# Patient Record
Sex: MALE | Race: WHITE | NOT HISPANIC OR LATINO | Employment: FULL TIME | URBAN - NONMETROPOLITAN AREA
[De-identification: names, ages, dates, MRNs, and addresses within clinical notes are randomized per-mention and may not be internally consistent; named-entity substitution may affect disease eponyms.]

---

## 2019-08-23 ENCOUNTER — TRANSCRIBE ORDERS (OUTPATIENT)
Dept: ADMINISTRATIVE | Facility: HOSPITAL | Age: 39
End: 2019-08-23

## 2019-08-23 DIAGNOSIS — G56.03 CARPAL TUNNEL SYNDROME, BILATERAL: Primary | ICD-10-CM

## 2019-09-10 ENCOUNTER — HOSPITAL ENCOUNTER (OUTPATIENT)
Dept: NEUROLOGY | Facility: HOSPITAL | Age: 39
Discharge: HOME | End: 2019-09-10
Admitting: ORTHOPAEDIC SURGERY

## 2019-09-10 DIAGNOSIS — G56.03 CARPAL TUNNEL SYNDROME, BILATERAL: ICD-10-CM

## 2019-09-10 PROCEDURE — 95886 MUSC TEST DONE W/N TEST COMP: CPT

## 2019-09-10 PROCEDURE — 95911 NRV CNDJ TEST 9-10 STUDIES: CPT

## 2019-09-24 ENCOUNTER — APPOINTMENT (OUTPATIENT)
Dept: PREADMISSION TESTING | Facility: HOSPITAL | Age: 39
End: 2019-09-24

## 2019-09-24 VITALS
HEIGHT: 69 IN | OXYGEN SATURATION: 98 % | WEIGHT: 231.26 LBS | RESPIRATION RATE: 16 BRPM | DIASTOLIC BLOOD PRESSURE: 75 MMHG | SYSTOLIC BLOOD PRESSURE: 165 MMHG | HEART RATE: 73 BPM | BODY MASS INDEX: 34.25 KG/M2

## 2019-09-24 LAB
ANION GAP SERPL CALCULATED.3IONS-SCNC: 13 MMOL/L (ref 5–15)
BUN BLD-MCNC: 28 MG/DL (ref 6–20)
BUN/CREAT SERPL: 23.3 (ref 7–25)
CALCIUM SPEC-SCNC: 9.4 MG/DL (ref 8.6–10.5)
CHLORIDE SERPL-SCNC: 104 MMOL/L (ref 98–107)
CO2 SERPL-SCNC: 25 MMOL/L (ref 22–29)
CREAT BLD-MCNC: 1.2 MG/DL (ref 0.76–1.27)
DEPRECATED RDW RBC AUTO: 39.5 FL (ref 37–54)
ERYTHROCYTE [DISTWIDTH] IN BLOOD BY AUTOMATED COUNT: 12.2 % (ref 12.3–15.4)
GFR SERPL CREATININE-BSD FRML MDRD: 67 ML/MIN/1.73
GLUCOSE BLD-MCNC: 97 MG/DL (ref 65–99)
HCT VFR BLD AUTO: 53.3 % (ref 37.5–51)
HGB BLD-MCNC: 19 G/DL (ref 13–17.7)
MCH RBC QN AUTO: 31.3 PG (ref 26.6–33)
MCHC RBC AUTO-ENTMCNC: 35.6 G/DL (ref 31.5–35.7)
MCV RBC AUTO: 87.7 FL (ref 79–97)
PLATELET # BLD AUTO: 197 10*3/MM3 (ref 140–450)
PMV BLD AUTO: 10.7 FL (ref 6–12)
POTASSIUM BLD-SCNC: 3.9 MMOL/L (ref 3.5–5.2)
RBC # BLD AUTO: 6.08 10*6/MM3 (ref 4.14–5.8)
SODIUM BLD-SCNC: 142 MMOL/L (ref 136–145)
WBC NRBC COR # BLD: 5.77 10*3/MM3 (ref 3.4–10.8)

## 2019-09-24 PROCEDURE — 93005 ELECTROCARDIOGRAM TRACING: CPT

## 2019-09-24 PROCEDURE — 36415 COLL VENOUS BLD VENIPUNCTURE: CPT

## 2019-09-24 PROCEDURE — 80048 BASIC METABOLIC PNL TOTAL CA: CPT | Performed by: ORTHOPAEDIC SURGERY

## 2019-09-24 PROCEDURE — 85027 COMPLETE CBC AUTOMATED: CPT | Performed by: ORTHOPAEDIC SURGERY

## 2019-09-24 PROCEDURE — 93010 ELECTROCARDIOGRAM REPORT: CPT | Performed by: INTERNAL MEDICINE

## 2019-09-24 NOTE — DISCHARGE INSTRUCTIONS
DAY OF SURGERY INSTRUCTIONS        YOUR SURGEON: dr doran    PROCEDURE: ***right carpal geo release    DATE OF SURGERY: ***10/01/2019    ARRIVAL TIME: AS DIRECTED BY OFFICE    YOU MAY TAKE THE FOLLOWING MEDICATION(S) THE MORNING OF SURGERY WITH A SIP OF WATER: ***NO MEDS PER  ANESTHESIA      ALL OTHER HOME MEDICATION CHECK WITH YOUR PHYSICIAN                MANAGING PAIN AFTER SURGERY    We know you are probably wondering what your pain will be like after surgery.  Following surgery it is unrealistic to expect you will not have pain.   Pain is how our bodies let us know that something is wrong or cautions us to be careful.  That said, our goal is to make your pain tolerable.    Methods we may use to treat your pain include (oral or IV medications, PCAs, epidurals, nerve blocks, etc.)   While some procedures require IV pain medications for a short time after surgery, transitioning to pain medications by mouth allows for better management of pain.   Your nurse will encourage you to take oral pain medications whenever possible.  IV medications work almost immediately, but only last a short while.  Taking medications by mouth allows for a more constant level of medication in your blood stream for a longer period of time.      Once your pain is out of control it is harder to get back under control.  It is important you are aware when your next dose of pain medication is due.  If you are admitted, your nurse may write the time of your next dose on the white board in your room to help you remember.      We are interested in your pain and encourage you to inform us about aggravating factors during your visit.   Many times a simple repositioning every few hours can make a big difference.    If your physician says it is okay, do not let your pain prevent you from getting out of bed. Be sure to call your nurse for assistance prior to getting up so you do not fall.      Before surgery, please decide your tolerable pain  goal.  These faces help describe the pain ratings we use on a 0-10 scale.   Be prepared to tell us your goal and whether or not you take pain or anxiety medications at home.          BEFORE YOU COME TO THE HOSPITAL  (Pre-op instructions)  • Do not eat, drink, smoke or chew gum after midnight the night before surgery.  This also includes no mints.  • Morning of surgery take only the medicines you have been instructed with a sip of water unless otherwise instructed  by your physician.  • Do not shave, wear makeup or dark nail polish.  • Remove all jewelry including rings.  • Leave anything you consider valuable at home.  • Leave your suitcase in the car until after your surgery.  • Bring the following with you if applicable:  o Picture ID and insurance, Medicare or Medicaid cards  o Co-pay/deductible required by insurance (cash, check, credit card)  o Copy of advance directive, living will or power-of- documents if not brought to PAT  o CPAP or BIPAP mask and tubing  o Relaxation aids ( book, magazine), etc.  o Hearing aids                        ON THE DAY OF SURGERY  · On the day of surgery check in at registration located at the main entrance of the hospital.   ? You will be registered and given a beeper with instructions where to wait in the main lobby.  ? When your beeper lights up and vibrates a member of the Outpatient Surgery staff will meet you at the double doors under the stair steps and escort you to your preoperative room.   · You may have cloth compression devices placed on your legs. These help to prevent blood clots and reduce swelling in your legs.  · An IV may be inserted into one of your veins.  · In the operating room, you may be given one or more of the following:  ? A medicine to help you relax (sedative).  ? A medicine to numb the area (local anesthetic).  ? A medicine to make you fall asleep (general anesthetic).  ? A medicine that is injected into an area of your body to numb  "everything below the injection site (regional anesthetic).  · Your surgical site will be marked or identified.  · You may be given an antibiotic through your IV to help prevent infection.  Contact a health care provider if you:  · Develop a fever of more than 100.4°F (38°C) or other feelings of illness during the 48 hours before your surgery.  · Have symptoms that get worse.  Have questions or concerns about your surgery    General Anesthesia/Surgery, Adult  General anesthesia is the use of medicines to make a person \"go to sleep\" (unconscious) for a medical procedure. General anesthesia must be used for certain procedures, and is often recommended for procedures that:  · Last a long time.  · Require you to be still or in an unusual position.  · Are major and can cause blood loss.  The medicines used for general anesthesia are called general anesthetics. As well as making you unconscious for a certain amount of time, these medicines:  · Prevent pain.  · Control your blood pressure.  · Relax your muscles.  Tell a health care provider about:  · Any allergies you have.  · All medicines you are taking, including vitamins, herbs, eye drops, creams, and over-the-counter medicines.  · Any problems you or family members have had with anesthetic medicines.  · Types of anesthetics you have had in the past.  · Any blood disorders you have.  · Any surgeries you have had.  · Any medical conditions you have.  · Any recent upper respiratory, chest, or ear infections.  · Any history of:  ? Heart or lung conditions, such as heart failure, sleep apnea, asthma, or chronic obstructive pulmonary disease (COPD).  ?  service.  ? Depression or anxiety.  · Any tobacco or drug use, including marijuana or alcohol use.  · Whether you are pregnant or may be pregnant.  What are the risks?  Generally, this is a safe procedure. However, problems may occur, including:  · Allergic reaction.  · Lung and heart problems.  · Inhaling food or " liquid from the stomach into the lungs (aspiration).  · Nerve injury.  · Air in the bloodstream, which can lead to stroke.  · Extreme agitation or confusion (delirium) when you wake up from the anesthetic.  · Waking up during your procedure and being unable to move. This is rare.  These problems are more likely to develop if you are having a major surgery or if you have an advanced or serious medical condition. You can prevent some of these complications by answering all of your health care provider's questions thoroughly and by following all instructions before your procedure.  General anesthesia can cause side effects, including:  · Nausea or vomiting.  · A sore throat from the breathing tube.  · Hoarseness.  · Wheezing or coughing.  · Shaking chills.  · Tiredness.  · Body aches.  · Anxiety.  · Sleepiness or drowsiness.  · Confusion or agitation.  RISKS AND COMPLICATIONS OF SURGERY  Your health care provider will discuss possible risks and complications with you before surgery. Common risks and complications include:    · Problems due to the use of anesthetics.  · Blood loss and replacement (does not apply to minor surgical procedures).  · Temporary increase in pain due to surgery.  · Uncorrected pain or problems that the surgery was meant to correct.  · Infection.  · New damage.    What happens before the procedure?    Medicines  Ask your health care provider about:  · Changing or stopping your regular medicines. This is especially important if you are taking diabetes medicines or blood thinners.  · Taking medicines such as aspirin and ibuprofen. These medicines can thin your blood. Do not take these medicines unless your health care provider tells you to take them.  · Taking over-the-counter medicines, vitamins, herbs, and supplements. Do not take these during the week before your procedure unless your health care provider approves them.  General instructions  · Starting 3-6 weeks before the procedure, do not  use any products that contain nicotine or tobacco, such as cigarettes and e-cigarettes. If you need help quitting, ask your health care provider.  · If you brush your teeth on the morning of the procedure, make sure to spit out all of the toothpaste.  · Tell your health care provider if you become ill or develop a cold, cough, or fever.  · If instructed by your health care provider, bring your sleep apnea device with you on the day of your surgery (if applicable).  · Ask your health care provider if you will be going home the same day, the following day, or after a longer hospital stay.  ? Plan to have someone take you home from the hospital or clinic.  ? Plan to have a responsible adult care for you for at least 24 hours after you leave the hospital or clinic. This is important.  What happens during the procedure?  · You will be given anesthetics through both of the following:  ? A mask placed over your nose and mouth.  ? An IV in one of your veins.  · You may receive a medicine to help you relax (sedative).  · After you are unconscious, a breathing tube may be inserted down your throat to help you breathe. This will be removed before you wake up.  · An anesthesia specialist will stay with you throughout your procedure. He or she will:  ? Keep you comfortable and safe by continuing to give you medicines and adjusting the amount of medicine that you get.  ? Monitor your blood pressure, pulse, and oxygen levels to make sure that the anesthetics do not cause any problems.  The procedure may vary among health care providers and hospitals.  What happens after the procedure?  · Your blood pressure, temperature, heart rate, breathing rate, and blood oxygen level will be monitored until the medicines you were given have worn off.  · You will wake up in a recovery area. You may wake up slowly.  · If you feel anxious or agitated, you may be given medicine to help you calm down.  · If you will be going home the same day, your  health care provider may check to make sure you can walk, drink, and urinate.  · Your health care provider will treat any pain or side effects you have before you go home.  · Do not drive for 24 hours if you were given a sedative.  Summary  · General anesthesia is used to keep you still and prevent pain during a procedure.  · It is important to tell your healthcare provider about your medical history and any surgeries you have had, and previous experience with anesthesia.  · Follow your healthcare provider’s instructions about when to stop eating, drinking, or taking certain medicines before your procedure.  · Plan to have someone take you home from the hospital or clinic.  This information is not intended to replace advice given to you by your health care provider. Make sure you discuss any questions you have with your health care provider.  Document Released: 03/26/2009 Document Revised: 08/03/2018 Document Reviewed: 08/03/2018  [employer] Interactive Patient Education © 2019 [employer]       Fall Prevention in Hospitals, Adult  As a hospital patient, your condition and the treatments you receive can increase your risk for falls. Some additional risk factors for falls in a hospital include:  · Being in an unfamiliar environment.  · Being on bed rest.  · Your surgery.  · Taking certain medicines.  · Your tubing requirements, such as intravenous (IV) therapy or catheters.  It is important that you learn how to decrease fall risks while at the hospital. Below are important tips that can help prevent falls.  SAFETY TIPS FOR PREVENTING FALLS  Talk about your risk of falling.  · Ask your health care provider why you are at risk for falling. Is it your medicine, illness, tubing placement, or something else?  · Make a plan with your health care provider to keep you safe from falls.  · Ask your health care provider or pharmacist about side effects of your medicines. Some medicines can make you dizzy or affect your  coordination.  Ask for help.  · Ask for help before getting out of bed. You may need to press your call button.  · Ask for assistance in getting safely to the toilet.  · Ask for a walker or cane to be put at your bedside. Ask that most of the side rails on your bed be placed up before your health care provider leaves the room.  · Ask family or friends to sit with you.  · Ask for things that are out of your reach, such as your glasses, hearing aids, telephone, bedside table, or call button.  Follow these tips to avoid falling:  · Stay lying or seated, rather than standing, while waiting for help.  · Wear rubber-soled slippers or shoes whenever you walk in the hospital.  · Avoid quick, sudden movements.  ¨ Change positions slowly.  ¨ Sit on the side of your bed before standing.  ¨ Stand up slowly and wait before you start to walk.  · Let your health care provider know if there is a spill on the floor.  · Pay careful attention to the medical equipment, electrical cords, and tubes around you.  · When you need help, use your call button by your bed or in the bathroom. Wait for one of your health care providers to help you.  · If you feel dizzy or unsure of your footing, return to bed and wait for assistance.  · Avoid being distracted by the TV, telephone, or another person in your room.  · Do not lean or support yourself on rolling objects, such as IV poles or bedside tables.     This information is not intended to replace advice given to you by your health care provider. Make sure you discuss any questions you have with your health care provider.     Document Released: 12/15/2001 Document Revised: 01/08/2016 Document Reviewed: 08/25/2013  [employer] Interactive Patient Education ©2016 [employer]       Surgical Site Infections FAQs  What is a Surgical Site Infection (SSI)?  A surgical site infection is an infection that occurs after surgery in the part of the body where the surgery took place. Most patients who have  surgery do not develop an infection. However, infections develop in about 1 to 3 out of every 100 patients who have surgery.  Some of the common symptoms of a surgical site infection are:  · Redness and pain around the area where you had surgery  · Drainage of cloudy fluid from your surgical wound  · Fever  Can SSIs be treated?  Yes. Most surgical site infections can be treated with antibiotics. The antibiotic given to you depends on the bacteria (germs) causing the infection. Sometimes patients with SSIs also need another surgery to treat the infection.  What are some of the things that hospitals are doing to prevent SSIs?  To prevent SSIs, doctors, nurses, and other healthcare providers:  · Clean their hands and arms up to their elbows with an antiseptic agent just before the surgery.  · Clean their hands with soap and water or an alcohol-based hand rub before and after caring for each patient.  · May remove some of your hair immediately before your surgery using electric clippers if the hair is in the same area where the procedure will occur. They should not shave you with a razor.  · Wear special hair covers, masks, gowns, and gloves during surgery to keep the surgery area clean.  · Give you antibiotics before your surgery starts. In most cases, you should get antibiotics within 60 minutes before the surgery starts and the antibiotics should be stopped within 24 hours after surgery.  · Clean the skin at the site of your surgery with a special soap that kills germs.  What can I do to help prevent SSIs?  Before your surgery:  · Tell your doctor about other medical problems you may have. Health problems such as allergies, diabetes, and obesity could affect your surgery and your treatment.  · Quit smoking. Patients who smoke get more infections. Talk to your doctor about how you can quit before your surgery.  · Do not shave near where you will have surgery. Shaving with a razor can irritate your skin and make it  easier to develop an infection.  At the time of your surgery:  · Speak up if someone tries to shave you with a razor before surgery. Ask why you need to be shaved and talk with your surgeon if you have any concerns.  · Ask if you will get antibiotics before surgery.  After your surgery:  · Make sure that your healthcare providers clean their hands before examining you, either with soap and water or an alcohol-based hand rub.  · If you do not see your providers clean their hands, please ask them to do so.  · Family and friends who visit you should not touch the surgical wound or dressings.  · Family and friends should clean their hands with soap and water or an alcohol-based hand rub before and after visiting you. If you do not see them clean their hands, ask them to clean their hands.  What do I need to do when I go home from the hospital?  · Before you go home, your doctor or nurse should explain everything you need to know about taking care of your wound. Make sure you understand how to care for your wound before you leave the hospital.  · Always clean your hands before and after caring for your wound.  · Before you go home, make sure you know who to contact if you have questions or problems after you get home.  · If you have any symptoms of an infection, such as redness and pain at the surgery site, drainage, or fever, call your doctor immediately.  If you have additional questions, please ask your doctor or nurse.  Developed and co-sponsored by The Society for Healthcare Epidemiology of [place] (SHEA); Infectious Diseases Society of [place] (IDSA); American Hospital Association; Association for Professionals in Infection Control and Epidemiology (APIC); Centers for Disease Control and Prevention (CDC); and The Joint Commission.     This information is not intended to replace advice given to you by your health care provider. Make sure you discuss any questions you have with your health care provider.     Document  Released: 12/23/2014 Document Revised: 01/08/2016 Document Reviewed: 03/02/2016  [employer] Interactive Patient Education ©2016 [employer]           [facility]  CHG 4% Patient Instruction Sheet    Chlorhexidine Before Surgery  Chlorhexidine gluconate (CHG) is a germ-killing (antiseptic) solution that is used to clean the skin. It gets rid of the bacteria that normally live on the skin. Cleaning your skin with CHG before surgery helps lower the risk for infection after surgery.    How to use CHG solution  · You will take 2 showers, one shower the night before surgery, the second shower the morning of surgery before coming to the hospital.  · Use CHG only as told by your health care provider, and follow the instructions on the label.  · Use CHG solution while taking a shower. Follow these steps when using CHG solution (unless your health care provider gives you different instructions):  1. Start the shower.  2. Use your normal soap and shampoo to wash your face and hair.  3. Turn off the shower or move out of the shower stream.  4. Pour the CHG onto a clean washcloth. Do not use any type of brush or rough-edged sponge.  5. Starting at your neck, lather your body down to your toes. Make sure you:  6. Pay special attention to the part of your body where you will be having surgery. Scrub this area for at least 1 minute.  7. Use the full amount of CHG as directed. Usually, this is one half bottle for each shower.  8. Do not use CHG on your head or face. If the solution gets into your ears or eyes, rinse them well with water.  9. Avoid your genital area.  10. Avoid any areas of skin that have broken skin, cuts, or scrapes.  11. Scrub your back and under your arms. Make sure to wash skin folds.  12. Let the lather sit on your skin for 1-2 minutes or as long as told by your health care  provider.  13. Thoroughly rinse your entire body in the shower. Make sure that all body creases and crevices are rinsed  well.  14. Dry off with a clean towel. Do not put any substances on your body afterward, such as powder, lotion, or perfume.  15. Put on clean clothes or pajamas.  16. If it is the night before your surgery, sleep in clean sheets.    What are the risks?  Risks of using CHG include:  · A skin reaction.  · Hearing loss, if CHG gets in your ears.  · Eye injury, if CHG gets in your eyes and is not rinsed out.  · The CHG product catching fire.  Make sure that you avoid smoking and flames after applying CHG to your skin.  Do not use CHG:  · If you have a chlorhexidine allergy or have previously reacted to chlorhexidine.  · On babies younger than 2 months of age.      On the day of surgery, when you are taken to your room in Outpatient Surgery you will be given a CHG prepackaged cloth to wipe the site for your surgery.  How to use CHG prepackaged cloths  · Follow the instructions on the label.  · Use the CHG cloth on clean, dry skin. Follow these steps when using a CHG cloth (unless your health care provider gives you different instructions):  1. Using the CHG cloth, vigorously scrub the part of your body where you will be having surgery. Scrub using a back-and-forth motion for 3 minutes. The area on your body should be completely wet with CHG when you are finished scrubbing.  2. Do not rinse. Discard the cloth and let the area air-dry for 1 minute. Do not put any substances on your body afterward, such as powder, lotion, or perfume.  Contact a health care provider if:  · Your skin gets irritated after scrubbing.  · You have questions about using your solution or cloth.  Get help right away if:  · Your eyes become very red or swollen.  · Your eyes itch badly.  · Your skin itches badly and is red or swollen.  · Your hearing changes.  · You have trouble seeing.  · You have swelling or tingling in your mouth or throat.  · You have trouble breathing.  · You swallow any chlorhexidine.  Summary  · Chlorhexidine gluconate (CHG)  is a germ-killing (antiseptic) solution that is used to clean the skin. Cleaning your skin with CHG before surgery helps lower the risk for infection after surgery.  · You may be given CHG to use at home. It may be in a bottle or in a prepackaged cloth to use on your skin. Carefully follow your health care provider's instructions and the instructions on the product label.  · Do not use CHG if you have a chlorhexidine allergy.  · Contact your health care provider if your skin gets irritated after scrubbing.  This information is not intended to replace advice given to you by your health care provider. Make sure you discuss any questions you have with your health care provider.  Document Released: 09/11/2013 Document Revised: 11/15/2018 Document Reviewed: 11/15/2018  [employer] Interactive Patient Education © 2019 [employer]          PATIENT/FAMILY/RESPONSIBLE PARTY VERBALIZES UNDERSTANDING OF ABOVE EDUCATION.  COPY OF PAIN SCALE GIVEN AND REVIEWED WITH VERBALIZED UNDERSTANDING.

## 2019-09-30 PROBLEM — G56.01 RIGHT CARPAL TUNNEL SYNDROME: Status: ACTIVE | Noted: 2019-09-30

## 2019-10-01 ENCOUNTER — ANESTHESIA (OUTPATIENT)
Dept: PERIOP | Facility: HOSPITAL | Age: 39
End: 2019-10-01

## 2019-10-01 ENCOUNTER — HOSPITAL ENCOUNTER (OUTPATIENT)
Facility: HOSPITAL | Age: 39
Setting detail: HOSPITAL OUTPATIENT SURGERY
Discharge: HOME | End: 2019-10-01
Attending: ORTHOPAEDIC SURGERY | Admitting: ORTHOPAEDIC SURGERY

## 2019-10-01 ENCOUNTER — ANESTHESIA EVENT (OUTPATIENT)
Dept: PERIOP | Facility: HOSPITAL | Age: 39
End: 2019-10-01

## 2019-10-01 VITALS
OXYGEN SATURATION: 90 % | DIASTOLIC BLOOD PRESSURE: 70 MMHG | RESPIRATION RATE: 16 BRPM | SYSTOLIC BLOOD PRESSURE: 121 MMHG | HEART RATE: 79 BPM | TEMPERATURE: 99 F

## 2019-10-01 DIAGNOSIS — G56.01 RIGHT CARPAL TUNNEL SYNDROME: Primary | ICD-10-CM

## 2019-10-01 PROCEDURE — 25010000002 FENTANYL CITRATE (PF) 250 MCG/5ML SOLUTION: Performed by: NURSE ANESTHETIST, CERTIFIED REGISTERED

## 2019-10-01 PROCEDURE — 25010000002 PROPOFOL 10 MG/ML EMULSION: Performed by: NURSE ANESTHETIST, CERTIFIED REGISTERED

## 2019-10-01 PROCEDURE — 25010000002 MIDAZOLAM PER 1 MG: Performed by: NURSE ANESTHETIST, CERTIFIED REGISTERED

## 2019-10-01 PROCEDURE — 25010000002 ONDANSETRON PER 1 MG: Performed by: NURSE ANESTHETIST, CERTIFIED REGISTERED

## 2019-10-01 RX ORDER — ONDANSETRON 2 MG/ML
INJECTION INTRAMUSCULAR; INTRAVENOUS AS NEEDED
Status: DISCONTINUED | OUTPATIENT
Start: 2019-10-01 | End: 2019-10-01 | Stop reason: SURG

## 2019-10-01 RX ORDER — METOCLOPRAMIDE HYDROCHLORIDE 5 MG/ML
5 INJECTION INTRAMUSCULAR; INTRAVENOUS
Status: DISCONTINUED | OUTPATIENT
Start: 2019-10-01 | End: 2019-10-01 | Stop reason: HOSPADM

## 2019-10-01 RX ORDER — SODIUM CHLORIDE, SODIUM LACTATE, POTASSIUM CHLORIDE, CALCIUM CHLORIDE 600; 310; 30; 20 MG/100ML; MG/100ML; MG/100ML; MG/100ML
1000 INJECTION, SOLUTION INTRAVENOUS CONTINUOUS
Status: DISCONTINUED | OUTPATIENT
Start: 2019-10-01 | End: 2019-10-01 | Stop reason: HOSPADM

## 2019-10-01 RX ORDER — MIDAZOLAM HYDROCHLORIDE 1 MG/ML
1 INJECTION INTRAMUSCULAR; INTRAVENOUS
Status: DISCONTINUED | OUTPATIENT
Start: 2019-10-01 | End: 2019-10-01 | Stop reason: HOSPADM

## 2019-10-01 RX ORDER — SODIUM CHLORIDE, SODIUM LACTATE, POTASSIUM CHLORIDE, CALCIUM CHLORIDE 600; 310; 30; 20 MG/100ML; MG/100ML; MG/100ML; MG/100ML
100 INJECTION, SOLUTION INTRAVENOUS CONTINUOUS
Status: DISCONTINUED | OUTPATIENT
Start: 2019-10-01 | End: 2019-10-01 | Stop reason: HOSPADM

## 2019-10-01 RX ORDER — SODIUM CHLORIDE 0.9 % (FLUSH) 0.9 %
3 SYRINGE (ML) INJECTION AS NEEDED
Status: DISCONTINUED | OUTPATIENT
Start: 2019-10-01 | End: 2019-10-01 | Stop reason: HOSPADM

## 2019-10-01 RX ORDER — OXYCODONE AND ACETAMINOPHEN 10; 325 MG/1; MG/1
1 TABLET ORAL ONCE AS NEEDED
Status: COMPLETED | OUTPATIENT
Start: 2019-10-01 | End: 2019-10-01

## 2019-10-01 RX ORDER — SODIUM CHLORIDE 0.9 % (FLUSH) 0.9 %
3 SYRINGE (ML) INJECTION EVERY 12 HOURS SCHEDULED
Status: DISCONTINUED | OUTPATIENT
Start: 2019-10-01 | End: 2019-10-01 | Stop reason: HOSPADM

## 2019-10-01 RX ORDER — IBUPROFEN 600 MG/1
600 TABLET ORAL ONCE AS NEEDED
Status: DISCONTINUED | OUTPATIENT
Start: 2019-10-01 | End: 2019-10-01 | Stop reason: HOSPADM

## 2019-10-01 RX ORDER — BUPIVACAINE HCL/0.9 % NACL/PF 0.1 %
2 PLASTIC BAG, INJECTION (ML) EPIDURAL ONCE
Status: COMPLETED | OUTPATIENT
Start: 2019-10-01 | End: 2019-10-01

## 2019-10-01 RX ORDER — ONDANSETRON 2 MG/ML
4 INJECTION INTRAMUSCULAR; INTRAVENOUS ONCE AS NEEDED
Status: DISCONTINUED | OUTPATIENT
Start: 2019-10-01 | End: 2019-10-01 | Stop reason: HOSPADM

## 2019-10-01 RX ORDER — IPRATROPIUM BROMIDE AND ALBUTEROL SULFATE 2.5; .5 MG/3ML; MG/3ML
3 SOLUTION RESPIRATORY (INHALATION) ONCE AS NEEDED
Status: DISCONTINUED | OUTPATIENT
Start: 2019-10-01 | End: 2019-10-01 | Stop reason: HOSPADM

## 2019-10-01 RX ORDER — LABETALOL HYDROCHLORIDE 5 MG/ML
5 INJECTION, SOLUTION INTRAVENOUS
Status: DISCONTINUED | OUTPATIENT
Start: 2019-10-01 | End: 2019-10-01 | Stop reason: HOSPADM

## 2019-10-01 RX ORDER — SODIUM CHLORIDE 0.9 % (FLUSH) 0.9 %
3-10 SYRINGE (ML) INJECTION AS NEEDED
Status: DISCONTINUED | OUTPATIENT
Start: 2019-10-01 | End: 2019-10-01 | Stop reason: HOSPADM

## 2019-10-01 RX ORDER — PROPOFOL 10 MG/ML
VIAL (ML) INTRAVENOUS AS NEEDED
Status: DISCONTINUED | OUTPATIENT
Start: 2019-10-01 | End: 2019-10-01 | Stop reason: SURG

## 2019-10-01 RX ORDER — MIDAZOLAM HYDROCHLORIDE 1 MG/ML
2 INJECTION INTRAMUSCULAR; INTRAVENOUS
Status: DISCONTINUED | OUTPATIENT
Start: 2019-10-01 | End: 2019-10-01 | Stop reason: HOSPADM

## 2019-10-01 RX ORDER — ACETAMINOPHEN 500 MG
1000 TABLET ORAL ONCE
Status: COMPLETED | OUTPATIENT
Start: 2019-10-01 | End: 2019-10-01

## 2019-10-01 RX ORDER — NALOXONE HCL 0.4 MG/ML
0.4 VIAL (ML) INJECTION AS NEEDED
Status: DISCONTINUED | OUTPATIENT
Start: 2019-10-01 | End: 2019-10-01 | Stop reason: HOSPADM

## 2019-10-01 RX ORDER — HYDROCODONE BITARTRATE AND ACETAMINOPHEN 5; 325 MG/1; MG/1
TABLET ORAL
Qty: 15 TABLET | Refills: 0 | Status: SHIPPED | OUTPATIENT
Start: 2019-10-01 | End: 2023-03-15

## 2019-10-01 RX ORDER — OXYCODONE AND ACETAMINOPHEN 7.5; 325 MG/1; MG/1
2 TABLET ORAL EVERY 4 HOURS PRN
Status: DISCONTINUED | OUTPATIENT
Start: 2019-10-01 | End: 2019-10-01 | Stop reason: HOSPADM

## 2019-10-01 RX ORDER — MAGNESIUM HYDROXIDE 1200 MG/15ML
LIQUID ORAL AS NEEDED
Status: DISCONTINUED | OUTPATIENT
Start: 2019-10-01 | End: 2019-10-01 | Stop reason: HOSPADM

## 2019-10-01 RX ORDER — FENTANYL CITRATE 50 UG/ML
25 INJECTION, SOLUTION INTRAMUSCULAR; INTRAVENOUS AS NEEDED
Status: DISCONTINUED | OUTPATIENT
Start: 2019-10-01 | End: 2019-10-01 | Stop reason: HOSPADM

## 2019-10-01 RX ORDER — FENTANYL CITRATE 50 UG/ML
INJECTION, SOLUTION INTRAMUSCULAR; INTRAVENOUS AS NEEDED
Status: DISCONTINUED | OUTPATIENT
Start: 2019-10-01 | End: 2019-10-01 | Stop reason: SURG

## 2019-10-01 RX ADMIN — PROPOFOL 200 MG: 10 INJECTION, EMULSION INTRAVENOUS at 08:51

## 2019-10-01 RX ADMIN — LIDOCAINE HYDROCHLORIDE 100 MG: 20 INJECTION, SOLUTION INTRAVENOUS at 08:51

## 2019-10-01 RX ADMIN — FENTANYL CITRATE 150 MCG: 50 INJECTION INTRAMUSCULAR; INTRAVENOUS at 08:51

## 2019-10-01 RX ADMIN — OXYCODONE HYDROCHLORIDE AND ACETAMINOPHEN 1 TABLET: 10; 325 TABLET ORAL at 10:04

## 2019-10-01 RX ADMIN — MIDAZOLAM HYDROCHLORIDE 2 MG: 1 INJECTION, SOLUTION INTRAMUSCULAR; INTRAVENOUS at 08:38

## 2019-10-01 RX ADMIN — ACETAMINOPHEN 1000 MG: 500 TABLET, FILM COATED ORAL at 08:38

## 2019-10-01 RX ADMIN — SODIUM CHLORIDE, POTASSIUM CHLORIDE, SODIUM LACTATE AND CALCIUM CHLORIDE 1000 ML: 600; 310; 30; 20 INJECTION, SOLUTION INTRAVENOUS at 08:13

## 2019-10-01 RX ADMIN — ONDANSETRON HYDROCHLORIDE 4 MG: 2 SOLUTION INTRAMUSCULAR; INTRAVENOUS at 09:02

## 2019-10-01 RX ADMIN — Medication 2 G: at 08:54

## 2019-10-01 RX ADMIN — FENTANYL CITRATE 100 MCG: 50 INJECTION INTRAMUSCULAR; INTRAVENOUS at 08:54

## 2019-10-01 NOTE — ANESTHESIA POSTPROCEDURE EVALUATION
Patient: Garcia Greene    Procedure Summary     Date:  10/01/19 Room / Location:   [facility]  /  [facility]    Anesthesia Start:  0849 Anesthesia Stop:  0916    Procedure:  RIGHT CARPAL TUNNEL RELEASE (Right Wrist) Diagnosis:       Right carpal tunnel syndrome      (G56.01)    Surgeon:  Siva Dubois MD Provider:  León Baez CRNA    Anesthesia Type:  general ASA Status:  1          Anesthesia Type: general  Last vitals  BP   121/70 (10/01/19 1045)   Temp   99 °F (37.2 °C) (10/01/19 1005)   Pulse   79 (10/01/19 1045)   Resp   16 (10/01/19 1013)     SpO2   90 % (10/01/19 1045)     Post Anesthesia Care and Evaluation    Patient location during evaluation: PACU  Patient participation: complete - patient participated  Level of consciousness: awake and alert  Pain management: adequate  Airway patency: patent  Anesthetic complications: No anesthetic complications    Cardiovascular status: acceptable  Respiratory status: acceptable  Hydration status: acceptable    Comments: Blood pressure 121/70, pulse 79, temperature 99 °F (37.2 °C), temperature source Temporal, resp. rate 16, SpO2 90 %.    Pt discharged from PACU based on levi score >8  No anesthesia care post op

## 2019-10-01 NOTE — H&P
Pt Name: Garcia Greene  MRN: [id number]  YOB: 1980  Date of evaluation: 10/1/2019    H&P including current review of systems was updated in the paper chart and/or the document previously scanned into the record.  There have been no significant changes or new problems since the original evaluation.  The patient's problems continue and indications for contemplated procedure have not changed.    Electronically signed by Siva Dubois MD on 10/1/2019 at 8:41 AM

## 2019-10-01 NOTE — BRIEF OP NOTE
CARPAL TUNNEL RELEASE  Progress Note    Garcia Greene  10/1/2019    Pre-op Diagnosis:   G56.01       Post-Op Diagnosis Codes:     * Right carpal tunnel syndrome [G56.01]    Procedure/CPT® Codes:  CT REVISE MEDIAN N/CARPAL TUNNEL SURG [27442]    Procedure(s):  RIGHT CARPAL TUNNEL RELEASE    Surgeon(s):  Siva Dubois MD    Anesthesia: General with Block    Staff:   Circulator: Zainab Magdaleno RN  Scrub Person: Jojo Churchill  Assistant: Laureano Balderas PA-C; Mary Kate Pierre    Estimated Blood Loss: minimal    Urine Voided: * No values recorded between 10/1/2019  8:46 AM and 10/1/2019  9:12 AM *    Specimens:                None          Drains:      Findings: see op note     Complications: none      Siva Dubois MD     Date: 10/1/2019  Time: 9:12 AM

## 2019-10-01 NOTE — ANESTHESIA PREPROCEDURE EVALUATION
Anesthesia Evaluation     Patient summary reviewed   NPO Solid Status: > 8 hours  NPO Liquid Status: > 8 hours           Airway   TM distance: >3 FB  No difficulty expected  Dental - normal exam     Pulmonary - normal exam   (+) sleep apnea on CPAP,   Cardiovascular   Exercise tolerance: good (4-7 METS)    Rhythm: regular  Rate: normal        Neuro/Psych  GI/Hepatic/Renal/Endo - negative ROS     Musculoskeletal (-) negative ROS    Abdominal    Substance History - negative use     OB/GYN negative ob/gyn ROS         Other                        Anesthesia Plan    ASA 1     general     intravenous induction   Anesthetic plan, all risks, benefits, and alternatives have been provided, discussed and informed consent has been obtained with: patient.

## 2019-10-01 NOTE — ANESTHESIA PROCEDURE NOTES
Airway  Urgency: elective    Date/Time: 10/1/2019 8:52 AM  Airway not difficult    General Information and Staff    Patient location during procedure: OR    Indications and Patient Condition  Indications for airway management: airway protection    Preoxygenated: yes  MILS maintained throughout  Mask difficulty assessment: 1 - vent by mask    Final Airway Details  Final airway type: supraglottic airway      Successful airway: classic and I-gel  Size 5    Number of attempts at approach: 1  Assessment: lips, teeth, and gum same as pre-op and atraumatic intubation

## 2023-03-15 ENCOUNTER — OFFICE VISIT (OUTPATIENT)
Dept: FAMILY MEDICINE CLINIC | Facility: CLINIC | Age: 43
End: 2023-03-15
Payer: MEDICAID

## 2023-03-15 VITALS
HEART RATE: 71 BPM | DIASTOLIC BLOOD PRESSURE: 81 MMHG | HEIGHT: 69 IN | SYSTOLIC BLOOD PRESSURE: 120 MMHG | WEIGHT: 217.6 LBS | BODY MASS INDEX: 32.23 KG/M2 | TEMPERATURE: 98.7 F | OXYGEN SATURATION: 98 %

## 2023-03-15 DIAGNOSIS — E03.9 ACQUIRED HYPOTHYROIDISM: ICD-10-CM

## 2023-03-15 DIAGNOSIS — Z00.00 ANNUAL PHYSICAL EXAM: Primary | ICD-10-CM

## 2023-03-15 DIAGNOSIS — Z13.220 LIPID SCREENING: ICD-10-CM

## 2023-03-15 DIAGNOSIS — Z11.59 NEED FOR HEPATITIS C SCREENING TEST: ICD-10-CM

## 2023-03-15 DIAGNOSIS — A09 DIARRHEA OF INFECTIOUS ORIGIN: ICD-10-CM

## 2023-03-15 DIAGNOSIS — R53.83 FATIGUE, UNSPECIFIED TYPE: ICD-10-CM

## 2023-03-15 RX ORDER — DIPHENOXYLATE HYDROCHLORIDE AND ATROPINE SULFATE 2.5; .025 MG/1; MG/1
1 TABLET ORAL 4 TIMES DAILY PRN
Qty: 30 TABLET | Refills: 0 | Status: SHIPPED | OUTPATIENT
Start: 2023-03-15

## 2023-03-15 RX ORDER — LEVOTHYROXINE, LIOTHYRONINE 76; 18 UG/1; UG/1
120 TABLET ORAL DAILY
COMMUNITY
Start: 2023-01-05

## 2023-03-15 RX ORDER — TESTOSTERONE CYPIONATE 200 MG/ML
INJECTION, SOLUTION INTRAMUSCULAR
COMMUNITY
Start: 2023-01-05

## 2023-03-15 NOTE — PROGRESS NOTES
CC: annual physical / diarrhea    History:  Garcia Greene is a 42 y.o. male who presents today for evaluation of the above problems.      Patient reports that he has had diarrhea since last Thursday.  Initially had fatigue and headache along with abdominal cramping. No one else is sick.  His son did have vomitting one evening, but it resolved and he has had no other symptoms. Patient's abdominal cramping has resolved and he states that the diarrhea has subsided in the day, but returns every night. States that the odor has been very foul.    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  Does consume their own products, including drinking raw eggs.     Has previously seen Dr. Treadwell in [place]. Has taken thyroid medication at one point, and blood pressure has been elevated on occasion.  Patient is an avid weight .     HPI  ROS:  Review of Systems   Constitutional: Positive for fatigue.   Gastrointestinal: Positive for diarrhea.   Neurological: Positive for headaches.       No Known Allergies  Past Medical History:   Diagnosis Date   • Carpal tunnel syndrome    • Disease of thyroid gland    • Sleep apnea     uses cpap     Past Surgical History:   Procedure Laterality Date   • CARPAL TUNNEL RELEASE Right 10/1/2019    Procedure: RIGHT CARPAL TUNNEL RELEASE;  Surgeon: Siva Dubois MD;  Location: [facility];  Service: Orthopedics   • HERNIA REPAIR Right 2006    inguinal     History reviewed. No pertinent family history.   reports that he has never smoked. He has never been exposed to tobacco smoke. He has never used smokeless tobacco. He reports that he does not drink alcohol and does not use drugs.      Current Outpatient Medications:   •  Cholecalciferol (VITAMIN D3) 5000 units capsule capsule, Take 1 capsule by mouth Daily., Disp: , Rfl:   •  KRILL OIL PO, Take 700 mg by mouth Daily., Disp: , Rfl:   •  NP Thyroid 120 MG tablet, Take 1 tablet by mouth Daily., Disp: , Rfl:   •  Testosterone  "Cypionate (DEPOTESTOTERONE CYPIONATE) 200 MG/ML injection, , Disp: , Rfl:   •  TURMERIC PO, Take 3,000 mg by mouth Daily., Disp: , Rfl:   •  diphenoxylate-atropine (Lomotil) 2.5-0.025 MG per tablet, Take 1 tablet by mouth 4 (Four) Times a Day As Needed for Diarrhea., Disp: 30 tablet, Rfl: 0    OBJECTIVE:  /81 (BP Location: Right arm, Patient Position: Sitting, Cuff Size: Large Adult)   Pulse 71   Temp 98.7 °F (37.1 °C) (Temporal)   Ht 175.3 cm (69\")   Wt 98.7 kg (217 lb 9.6 oz)   SpO2 98%   BMI 32.13 kg/m²    Physical Exam  Vitals reviewed.   Constitutional:       Appearance: He is well-developed.   HENT:      Right Ear: Tympanic membrane, ear canal and external ear normal.      Left Ear: Tympanic membrane and external ear normal.      Mouth/Throat:      Mouth: Mucous membranes are moist.      Pharynx: Oropharynx is clear.   Cardiovascular:      Rate and Rhythm: Normal rate and regular rhythm.      Heart sounds: Normal heart sounds.   Pulmonary:      Effort: Pulmonary effort is normal.      Breath sounds: Normal breath sounds.   Abdominal:      General: Abdomen is flat. Bowel sounds are normal.      Palpations: Abdomen is soft.   Neurological:      Mental Status: He is alert and oriented to person, place, and time.   Psychiatric:         Behavior: Behavior normal.         Assessment/Plan    Diagnoses and all orders for this visit:    1. Annual physical exam (Primary)    2. Diarrhea of infectious origin  -     Amylase  -     Lipase  -     Hepatitis Panel, Acute  -     Gastrointestinal Panel, PCR - Stool, Per Rectum; Future  -     diphenoxylate-atropine (Lomotil) 2.5-0.025 MG per tablet; Take 1 tablet by mouth 4 (Four) Times a Day As Needed for Diarrhea.  Dispense: 30 tablet; Refill: 0  -     ciprofloxacin (Cipro) 500 MG tablet; Take 1 tablet by mouth 2 (Two) Times a Day for 7 days.  Dispense: 14 tablet; Refill: 0  -     metroNIDAZOLE (Flagyl) 500 MG tablet; Take 1 tablet by mouth 3 (Three) Times a Day for " 7 days.  Dispense: 21 tablet; Refill: 0    3. Need for hepatitis C screening test  -     Hepatitis C antibody    4. Fatigue, unspecified type  -     CBC & Differential  -     Comprehensive Metabolic Panel    5. Lipid screening  -     Lipid Panel    6. Acquired hypothyroidism  -     TSH    Other orders  -     Interpretation:  -     PSA DIAGNOSTIC  -     Please Note  -     Written Authorization    [employer]TENA[employer]CE  Labs today including lipid screening and hep c screening. Patient does not take flu or COVID vaccination.     An After Visit Summary was printed and given to the patient at discharge.  Return in about 1 year (around 3/15/2024) for Annual physical.       Ny Martinez, CARLIN 3/15/23    Electronically signed.

## 2023-03-16 ENCOUNTER — LAB (OUTPATIENT)
Dept: INTERNAL MEDICINE | Facility: CLINIC | Age: 43
End: 2023-03-16
Payer: MEDICAID

## 2023-03-16 DIAGNOSIS — A09 DIARRHEA OF INFECTIOUS ORIGIN: ICD-10-CM

## 2023-03-16 LAB
ALBUMIN SERPL-MCNC: 4.3 G/DL (ref 4–5)
ALBUMIN/GLOB SERPL: 1.7 {RATIO} (ref 1.2–2.2)
ALP SERPL-CCNC: 85 IU/L (ref 44–121)
ALT SERPL-CCNC: 87 IU/L (ref 0–44)
AMYLASE SERPL-CCNC: 36 U/L (ref 31–110)
AST SERPL-CCNC: 49 IU/L (ref 0–40)
BASOPHILS # BLD AUTO: 0 X10E3/UL (ref 0–0.2)
BASOPHILS NFR BLD AUTO: 1 %
BILIRUB SERPL-MCNC: 0.8 MG/DL (ref 0–1.2)
BUN SERPL-MCNC: 14 MG/DL (ref 6–24)
BUN/CREAT SERPL: 10 (ref 9–20)
CALCIUM SERPL-MCNC: 9.3 MG/DL (ref 8.7–10.2)
CHLORIDE SERPL-SCNC: 98 MMOL/L (ref 96–106)
CHOLEST SERPL-MCNC: 235 MG/DL (ref 100–199)
CO2 SERPL-SCNC: 24 MMOL/L (ref 20–29)
CREAT SERPL-MCNC: 1.42 MG/DL (ref 0.76–1.27)
EGFRCR SERPLBLD CKD-EPI 2021: 62 ML/MIN/1.73
EOSINOPHIL # BLD AUTO: 0.1 X10E3/UL (ref 0–0.4)
EOSINOPHIL NFR BLD AUTO: 1 %
ERYTHROCYTE [DISTWIDTH] IN BLOOD BY AUTOMATED COUNT: 12.5 % (ref 11.6–15.4)
GLOBULIN SER CALC-MCNC: 2.6 G/DL (ref 1.5–4.5)
GLUCOSE SERPL-MCNC: 92 MG/DL (ref 70–99)
HAV IGM SERPL QL IA: NEGATIVE
HBV CORE IGM SERPL QL IA: NEGATIVE
HBV SURFACE AG SERPL QL IA: NEGATIVE
HCT VFR BLD AUTO: 56.8 % (ref 37.5–51)
HCV AB SERPL QL IA: NORMAL
HCV IGG SERPL QL IA: NON REACTIVE
HCV IGG SERPL QL IA: NON REACTIVE
HDLC SERPL-MCNC: 24 MG/DL
HGB BLD-MCNC: 19.7 G/DL (ref 13–17.7)
IMM GRANULOCYTES # BLD AUTO: 0.1 X10E3/UL (ref 0–0.1)
IMM GRANULOCYTES NFR BLD AUTO: 1 %
LDLC SERPL CALC-MCNC: 173 MG/DL (ref 0–99)
LIPASE SERPL-CCNC: 41 U/L (ref 13–78)
LYMPHOCYTES # BLD AUTO: 1.2 X10E3/UL (ref 0.7–3.1)
LYMPHOCYTES NFR BLD AUTO: 28 %
MCH RBC QN AUTO: 29.4 PG (ref 26.6–33)
MCHC RBC AUTO-ENTMCNC: 34.7 G/DL (ref 31.5–35.7)
MCV RBC AUTO: 85 FL (ref 79–97)
MONOCYTES # BLD AUTO: 0.5 X10E3/UL (ref 0.1–0.9)
MONOCYTES NFR BLD AUTO: 11 %
NEUTROPHILS # BLD AUTO: 2.4 X10E3/UL (ref 1.4–7)
NEUTROPHILS NFR BLD AUTO: 58 %
PLATELET # BLD AUTO: 212 X10E3/UL (ref 150–450)
POTASSIUM SERPL-SCNC: 3.9 MMOL/L (ref 3.5–5.2)
PROT SERPL-MCNC: 6.9 G/DL (ref 6–8.5)
RBC # BLD AUTO: 6.7 X10E6/UL (ref 4.14–5.8)
SODIUM SERPL-SCNC: 138 MMOL/L (ref 134–144)
TRIGL SERPL-MCNC: 201 MG/DL (ref 0–149)
TSH SERPL DL<=0.005 MIU/L-ACNC: 3.86 UIU/ML (ref 0.45–4.5)
VLDLC SERPL CALC-MCNC: 38 MG/DL (ref 5–40)
WBC # BLD AUTO: 4.3 X10E3/UL (ref 3.4–10.8)

## 2023-03-16 RX ORDER — CIPROFLOXACIN 500 MG/1
500 TABLET, FILM COATED ORAL 2 TIMES DAILY
Qty: 14 TABLET | Refills: 0 | Status: SHIPPED | OUTPATIENT
Start: 2023-03-16 | End: 2023-03-23

## 2023-03-16 RX ORDER — METRONIDAZOLE 500 MG/1
500 TABLET ORAL 3 TIMES DAILY
Qty: 21 TABLET | Refills: 0 | Status: SHIPPED | OUTPATIENT
Start: 2023-03-16 | End: 2023-03-23

## 2023-03-16 NOTE — PROGRESS NOTES
Patient advised of lab results. Dr. Treadwell follows his testosterone therapy, so let's send a copy of his lab work to him. Hemoglobin and hematocrit are elevated, likely due to testosterone therapy.  Cholesterol is elevated and patient was advised that I would recommend statin therapy, which he does not wish to start at this time. Patient was advised that testosterone therapy is not generally recommended once hematocrit is >50.    Was advised of elevated liver enzymes, however, with current illness this is not overly concerning to me.

## 2023-03-17 LAB
Lab: NORMAL
PSA SERPL-MCNC: 0.5 NG/ML (ref 0–4)
WRITTEN AUTHORIZATION: NORMAL

## 2023-03-20 LAB
ADV 40+41 DNA STL QL NAA+NON-PROBE: NOT DETECTED
ASTRO TYP 1-8 RNA STL QL NAA+NON-PROBE: NOT DETECTED
C CAYETANENSIS DNA STL QL NAA+NON-PROBE: NOT DETECTED
C COLI+JEJ+UPSA DNA STL QL NAA+NON-PROBE: DETECTED
C DIF TOX TCDA+TCDB STL QL NAA+NON-PROBE: NOT DETECTED
CRYPTOSP DNA STL QL NAA+NON-PROBE: NOT DETECTED
E COLI O157 DNA STL QL NAA+NON-PROBE: ABNORMAL
E HISTOLYT DNA STL QL NAA+NON-PROBE: NOT DETECTED
EAEC PAA PLAS AGGR+AATA ST NAA+NON-PRB: NOT DETECTED
EC STX1+STX2 GENES STL QL NAA+NON-PROBE: NOT DETECTED
EPEC EAE GENE STL QL NAA+NON-PROBE: NOT DETECTED
ETEC LTA+ST1A+ST1B TOX ST NAA+NON-PROBE: NOT DETECTED
G LAMBLIA DNA STL QL NAA+NON-PROBE: NOT DETECTED
NOROVIRUS GI+II RNA STL QL NAA+NON-PROBE: NOT DETECTED
P SHIGELLOIDES DNA STL QL NAA+NON-PROBE: NOT DETECTED
RVA RNA STL QL NAA+NON-PROBE: NOT DETECTED
S ENT+BONG DNA STL QL NAA+NON-PROBE: NOT DETECTED
SAPO I+II+IV+V RNA STL QL NAA+NON-PROBE: NOT DETECTED
SHIGELLA SP+EIEC IPAH ST NAA+NON-PROBE: NOT DETECTED
V CHOL+PARA+VUL DNA STL QL NAA+NON-PROBE: NOT DETECTED
V CHOLERAE DNA STL QL NAA+NON-PROBE: NOT DETECTED
Y ENTEROCOL DNA STL QL NAA+NON-PROBE: NOT DETECTED

## 2023-05-25 ENCOUNTER — TELEMEDICINE (OUTPATIENT)
Dept: FAMILY MEDICINE CLINIC | Facility: CLINIC | Age: 43
End: 2023-05-25
Payer: MEDICAID

## 2023-05-25 DIAGNOSIS — S46.312A RUPTURE OF LEFT TRICEPS TENDON, INITIAL ENCOUNTER: Primary | ICD-10-CM

## 2023-05-25 NOTE — PROGRESS NOTES
CC: left arm pain    History:  Garcia Greene is a 42 y.o. male who presents today for evaluation of the above problems.      About two weeks ago while working patient felt a pop in the area of his left triceps.  He has previously had tendonitis here, however, states that symptoms were different.  Since this time he has been unable to to fully flex or extend the left arm.  He has been using compression and anti-inflammatories, both oral and topical, as well as a therapy gun and e-stim for the past two weeks with no improvement.  He is unable to straighten the arm fully either actively or passively.     HPI  ROS:  Review of Systems   Musculoskeletal:        Pain of left triceps and elbow with decreased range of motion       No Known Allergies  Past Medical History:   Diagnosis Date   • Carpal tunnel syndrome    • Disease of thyroid gland    • Sleep apnea     uses cpap     Past Surgical History:   Procedure Laterality Date   • CARPAL TUNNEL RELEASE Right 10/1/2019    Procedure: RIGHT CARPAL TUNNEL RELEASE;  Surgeon: Siva Dubois MD;  Location: [facility];  Service: Orthopedics   • HERNIA REPAIR Right 2006    inguinal     No family history on file.   reports that he has never smoked. He has never been exposed to tobacco smoke. He has never used smokeless tobacco. He reports that he does not drink alcohol and does not use drugs.      Current Outpatient Medications:   •  Cholecalciferol (VITAMIN D3) 5000 units capsule capsule, Take 1 capsule by mouth Daily., Disp: , Rfl:   •  diphenoxylate-atropine (Lomotil) 2.5-0.025 MG per tablet, Take 1 tablet by mouth 4 (Four) Times a Day As Needed for Diarrhea., Disp: 30 tablet, Rfl: 0  •  KRILL OIL PO, Take 700 mg by mouth Daily., Disp: , Rfl:   •  NP Thyroid 120 MG tablet, Take 1 tablet by mouth Daily., Disp: , Rfl:   •  Testosterone Cypionate (DEPOTESTOTERONE CYPIONATE) 200 MG/ML injection, , Disp: , Rfl:   •  TURMERIC PO, Take 3,000 mg by mouth Daily., Disp: , Rfl:      OBJECTIVE:  There were no vitals taken for this visit.   Physical Exam  Vitals reviewed.   Constitutional:       Appearance: He is well-developed.   Cardiovascular:      Rate and Rhythm: Normal rate.   Pulmonary:      Effort: Pulmonary effort is normal.   Musculoskeletal:        Arms:    Neurological:      Mental Status: He is alert and oriented to person, place, and time.   Psychiatric:         Behavior: Behavior normal.         Assessment/Plan    Diagnoses and all orders for this visit:    1. Rupture of left triceps tendon, initial encounter (Primary)  -     MRI Elbow Left Without Contrast; Future  -     MRI Humerus Left With & Without Contrast; Future    Continue conservative therapy.    This visit was completed via secure Zoom connection.   Patient was in vehicle and I was in my office.     An After Visit Summary was printed and given to the patient at discharge.  Return if symptoms worsen or fail to improve, for Next scheduled follow up.       CARLIN Jansen 5/25/23    Electronically signed.

## 2023-08-07 ENCOUNTER — OFFICE VISIT (OUTPATIENT)
Dept: FAMILY MEDICINE CLINIC | Facility: CLINIC | Age: 43
End: 2023-08-07
Payer: MEDICAID

## 2023-08-07 ENCOUNTER — DOCUMENTATION (OUTPATIENT)
Dept: FAMILY MEDICINE CLINIC | Facility: CLINIC | Age: 43
End: 2023-08-07

## 2023-08-07 VITALS
OXYGEN SATURATION: 97 % | DIASTOLIC BLOOD PRESSURE: 81 MMHG | WEIGHT: 223 LBS | HEART RATE: 84 BPM | SYSTOLIC BLOOD PRESSURE: 125 MMHG | HEIGHT: 69 IN | TEMPERATURE: 98 F | BODY MASS INDEX: 33.03 KG/M2

## 2023-08-07 DIAGNOSIS — I48.91 ATRIAL FIBRILLATION, UNSPECIFIED TYPE: Primary | ICD-10-CM

## 2023-08-07 PROCEDURE — 99214 OFFICE O/P EST MOD 30 MIN: CPT | Performed by: NURSE PRACTITIONER

## 2023-08-07 PROCEDURE — 93000 ELECTROCARDIOGRAM COMPLETE: CPT | Performed by: NURSE PRACTITIONER

## 2023-08-07 RX ORDER — ATENOLOL 25 MG/1
25 TABLET ORAL DAILY
Qty: 30 TABLET | Refills: 1 | Status: SHIPPED | OUTPATIENT
Start: 2023-08-07

## 2023-08-07 RX ORDER — ATENOLOL 25 MG/1
25 TABLET ORAL DAILY
Qty: 30 TABLET | Refills: 1 | Status: SHIPPED | OUTPATIENT
Start: 2023-08-07 | End: 2023-08-07 | Stop reason: SDUPTHER

## 2023-08-07 NOTE — PROGRESS NOTES
CC: irregular heartbeat    History:  Garcia Greene is a 43 y.o. male who presents today for evaluation of the above problems.      Patient states that Friday he felt like his heart was racing and irregular for around 8 hours. Would get short of breath after very little activity.   Saturday and Sunday were better, but symptoms returned today. Patient has hx of elevated hemoglobin, likely related to testosterone therapy, which is monitored by Dr. Treadwell. Has attempted therapeutic blood donation to control, however, this has not been effective.      HPI  ROS:  Review of Systems   Respiratory:  Positive for shortness of breath.    Cardiovascular:  Positive for palpitations.     No Known Allergies  Past Medical History:   Diagnosis Date    Carpal tunnel syndrome     Disease of thyroid gland     Sleep apnea     uses cpap     Past Surgical History:   Procedure Laterality Date    CARPAL TUNNEL RELEASE Right 10/1/2019    Procedure: RIGHT CARPAL TUNNEL RELEASE;  Surgeon: Siva Dubois MD;  Location: [facility] OR;  Service: Orthopedics    HERNIA REPAIR Right 2006    inguinal     History reviewed. No pertinent family history.   reports that he has never smoked. He has never been exposed to tobacco smoke. He has never used smokeless tobacco. He reports that he does not drink alcohol and does not use drugs.      Current Outpatient Medications:     Cholecalciferol (VITAMIN D3) 5000 units capsule capsule, Take 1 capsule by mouth Daily., Disp: , Rfl:     KRILL OIL PO, Take 700 mg by mouth Daily., Disp: , Rfl:     NP Thyroid 120 MG tablet, Take 1 tablet by mouth Daily., Disp: , Rfl:     Testosterone Cypionate (DEPOTESTOTERONE CYPIONATE) 200 MG/ML injection, , Disp: , Rfl:     apixaban (ELIQUIS) 5 MG tablet tablet, Take 1 tablet by mouth 2 (Two) Times a Day., Disp: 60 tablet, Rfl:     atenolol (Tenormin) 25 MG tablet, Take 1 tablet by mouth Daily., Disp: 30 tablet, Rfl: 1    OBJECTIVE:  /81 (BP Location: Left arm, Patient  "Position: Sitting, Cuff Size: Large Adult)   Pulse 84   Temp 98 øF (36.7 øC) (Temporal)   Ht 175.3 cm (69\")   Wt 101 kg (223 lb)   SpO2 97%   BMI 32.93 kg/mý    Physical Exam  Vitals reviewed.   Constitutional:       Appearance: He is well-developed.   Cardiovascular:      Rate and Rhythm: Tachycardia present. Rhythm irregular.   Pulmonary:      Effort: Pulmonary effort is normal.      Breath sounds: Normal breath sounds.   Neurological:      Mental Status: He is alert and oriented to person, place, and time.   Psychiatric:         Behavior: Behavior normal.       Assessment/Plan    Diagnoses and all orders for this visit:    1. Atrial fibrillation, unspecified type (Primary)  -     CBC & Differential  -     Comprehensive Metabolic Panel  -     Discontinue: atenolol (Tenormin) 25 MG tablet; Take 1 tablet by mouth Daily.  Dispense: 30 tablet; Refill: 1  -     apixaban (ELIQUIS) 5 MG tablet tablet; Take 1 tablet by mouth 2 (Two) Times a Day.  Dispense: 60 tablet  -     Ambulatory Referral to Cardiology  -     ECG 12 Lead    Urgent referral to cardiology placed. Started on beta blocker and anti-coagulant. Atenolol reordered in separate encounter to send to a different pharmacy.   Encouraged no strenuous activity when he is symptomatic.     BMI is >= 30 and <35. (Class 1 Obesity). The following options were offered after discussion;: none (medical contraindication) Patient is  and has unusually high muscle mass.     An After Visit Summary was printed and given to the patient at discharge.  Return for Next scheduled follow up.       CARLIN Jansen 8/7/23    Electronically signed.  "

## 2023-08-08 PROCEDURE — 93000 ELECTROCARDIOGRAM COMPLETE: CPT | Performed by: NURSE PRACTITIONER

## 2023-08-08 NOTE — PROGRESS NOTES
Procedure     ECG 12 Lead    Date/Time: 8/8/2023 8:25 AM  Performed by: Hellen Hartmann MA  Authorized by: Ny Martniez APRN   Comparison: compared with previous ECG from 9/24/2019  Comparison to previous ECG: Previous was NSR.   Rhythm: atrial fibrillation  BPM: 131    Clinical impression: abnormal EKG  Comments: A-fib with moderate ST depression

## 2023-08-09 LAB
ALBUMIN SERPL-MCNC: 4.9 G/DL (ref 4.1–5.1)
ALBUMIN/GLOB SERPL: 2.2 {RATIO} (ref 1.2–2.2)
ALP SERPL-CCNC: 83 IU/L (ref 44–121)
ALT SERPL-CCNC: 33 IU/L (ref 0–44)
AST SERPL-CCNC: 28 IU/L (ref 0–40)
BASOPHILS # BLD AUTO: 0 X10E3/UL (ref 0–0.2)
BASOPHILS NFR BLD AUTO: 1 %
BILIRUB SERPL-MCNC: 0.4 MG/DL (ref 0–1.2)
BUN SERPL-MCNC: 25 MG/DL (ref 6–24)
BUN/CREAT SERPL: 14 (ref 9–20)
CALCIUM SERPL-MCNC: 9.5 MG/DL (ref 8.7–10.2)
CHLORIDE SERPL-SCNC: 98 MMOL/L (ref 96–106)
CO2 SERPL-SCNC: 18 MMOL/L (ref 20–29)
CREAT SERPL-MCNC: 1.73 MG/DL (ref 0.76–1.27)
EGFRCR SERPLBLD CKD-EPI 2021: 49 ML/MIN/1.73
EOSINOPHIL # BLD AUTO: 0.2 X10E3/UL (ref 0–0.4)
EOSINOPHIL NFR BLD AUTO: 2 %
ERYTHROCYTE [DISTWIDTH] IN BLOOD BY AUTOMATED COUNT: 14.6 % (ref 11.6–15.4)
GLOBULIN SER CALC-MCNC: 2.2 G/DL (ref 1.5–4.5)
GLUCOSE SERPL-MCNC: ABNORMAL MG/DL
HCT VFR BLD AUTO: 56.2 % (ref 37.5–51)
HGB BLD-MCNC: 19.7 G/DL (ref 13–17.7)
IMM GRANULOCYTES # BLD AUTO: 0 X10E3/UL (ref 0–0.1)
IMM GRANULOCYTES NFR BLD AUTO: 1 %
LYMPHOCYTES # BLD AUTO: 2.4 X10E3/UL (ref 0.7–3.1)
LYMPHOCYTES NFR BLD AUTO: 35 %
MCH RBC QN AUTO: 30.8 PG (ref 26.6–33)
MCHC RBC AUTO-ENTMCNC: 35.1 G/DL (ref 31.5–35.7)
MCV RBC AUTO: 88 FL (ref 79–97)
MONOCYTES # BLD AUTO: 0.7 X10E3/UL (ref 0.1–0.9)
MONOCYTES NFR BLD AUTO: 11 %
NEUTROPHILS # BLD AUTO: 3.5 X10E3/UL (ref 1.4–7)
NEUTROPHILS NFR BLD AUTO: 50 %
PLATELET # BLD AUTO: 207 X10E3/UL (ref 150–450)
POTASSIUM SERPL-SCNC: ABNORMAL MMOL/L
PROT SERPL-MCNC: 7.1 G/DL (ref 6–8.5)
RBC # BLD AUTO: 6.4 X10E6/UL (ref 4.14–5.8)
SODIUM SERPL-SCNC: 139 MMOL/L (ref 134–144)
WBC # BLD AUTO: 6.8 X10E3/UL (ref 3.4–10.8)

## 2023-08-14 ENCOUNTER — OFFICE VISIT (OUTPATIENT)
Dept: CARDIOLOGY | Facility: CLINIC | Age: 43
End: 2023-08-14
Payer: MEDICAID

## 2023-08-14 VITALS
HEART RATE: 63 BPM | OXYGEN SATURATION: 99 % | WEIGHT: 222 LBS | HEIGHT: 69 IN | SYSTOLIC BLOOD PRESSURE: 118 MMHG | DIASTOLIC BLOOD PRESSURE: 80 MMHG | BODY MASS INDEX: 32.88 KG/M2

## 2023-08-14 DIAGNOSIS — E03.9 HYPOTHYROIDISM, UNSPECIFIED TYPE: ICD-10-CM

## 2023-08-14 DIAGNOSIS — G47.33 OSA (OBSTRUCTIVE SLEEP APNEA): ICD-10-CM

## 2023-08-14 DIAGNOSIS — I48.0 PAROXYSMAL ATRIAL FIBRILLATION: Primary | ICD-10-CM

## 2023-08-14 PROCEDURE — 93000 ELECTROCARDIOGRAM COMPLETE: CPT | Performed by: INTERNAL MEDICINE

## 2023-08-14 PROCEDURE — 99204 OFFICE O/P NEW MOD 45 MIN: CPT | Performed by: INTERNAL MEDICINE

## 2023-08-14 RX ORDER — MULTIVIT WITH MINERALS/LUTEIN
TABLET ORAL
COMMUNITY
Start: 2010-08-12

## 2023-08-14 RX ORDER — ZINC GLUCONATE 50 MG
TABLET ORAL
COMMUNITY
Start: 2022-11-01

## 2023-08-14 RX ORDER — MAGNESIUM 200 MG
TABLET ORAL
COMMUNITY
Start: 2022-11-01

## 2023-08-14 NOTE — LETTER
August 14, 2023     CARLIN Almaraz  [address] [facility] [address]    Patient: Garcia Greene   YOB: 1980   Date of Visit: 8/14/2023       Dear CARLIN Almaraz,    Thank you for referring Garcia Greene to me for evaluation. Below is a copy of my consult note.    If you have questions, please do not hesitate to call me. I look forward to following Garcia along with you.         Sincerely,        Oswaldo Hill MD        CC: No Recipients      Reason for Visit: Atrial fibrillation.    HPI:  Garcia Greene is a 43 y.o. male is being seen for consultation today at the request of CARLIN Almaraz for assistance with atrial fibrillation.  EKG from 8/7/2023 showed atrial fibrillation with RVR.  He was started on atenolol and anticoagulation with Eliquis.  The atrial fibrillation came out of nowhere and resolved on it's own.  He has had 3 total episodes.  The only association he has found with these is a supplement he takes before working out.  He avoids caffeine as well as processed foods.  He does get a lot of sodium in his diet.  He is active and sweats a lot during the day.  His blood pressure has improved since increasing sodium.  He has otherwise felt healthy and well.  He exercises regularly.  He lifts 3 days a week and does cardio 2 days a week.  He uses APAP regularly for his sleep apnea.  He has not had any updated evaluation on this recently.  He reports thyroid levels have been normal.    Previous Cardiac Testing and Procedures:  -EKG (8/7/2023) atrial fibrillation with RVR    Lab data:  -TSH (3/15/2023) 3.86  -Lipid panel (3/15/2023) total cholesterol 235, HDL 24, , triglycerides 201  -BMP (8/7/2023) creatinine 1.73, GFR 49, potassium not calculated, sodium 139  -CBC (8/7/2023) WBC 6.8, hemoglobin 19.7, hematocrit 56.2%, platelets 207    Patient Active Problem List   Diagnosis    Right carpal tunnel syndrome    Hypothyroid    KAILEY (obstructive sleep apnea)  "      Social History     Tobacco Use    Smoking status: Never     Passive exposure: Never    Smokeless tobacco: Never   Vaping Use    Vaping Use: Never used   Substance Use Topics    Alcohol use: No    Drug use: No       History reviewed. No pertinent family history.    The following portions of the patient's history were reviewed and updated as appropriate: allergies, current medications, past family history, past medical history, past social history, past surgical history, and problem list.      Current Outpatient Medications:     ascorbic acid (VITAMIN C) 1000 MG tablet, , Disp: , Rfl:     atenolol (Tenormin) 25 MG tablet, Take 1 tablet by mouth Daily., Disp: 30 tablet, Rfl: 1    Cholecalciferol (VITAMIN D3) 5000 units capsule capsule, Take 1 capsule by mouth Daily., Disp: , Rfl:     Coenzyme Q10 (CoQ-10) 100 MG capsule, , Disp: , Rfl:     KRILL OIL PO, Take 700 mg by mouth Daily., Disp: , Rfl:     Magnesium 200 MG tablet, , Disp: , Rfl:     NP Thyroid 120 MG tablet, Take 1 tablet by mouth Daily., Disp: , Rfl:     Testosterone Cypionate (DEPOTESTOTERONE CYPIONATE) 200 MG/ML injection, , Disp: , Rfl:     Zinc 50 MG tablet, , Disp: , Rfl:     Review of Systems   Constitutional: Negative for chills and fever.   Cardiovascular:  Positive for irregular heartbeat and palpitations. Negative for chest pain and paroxysmal nocturnal dyspnea.   Respiratory:  Negative for cough and shortness of breath.    Skin:  Negative for rash.   Gastrointestinal:  Negative for abdominal pain and heartburn.   Neurological:  Negative for dizziness and numbness.     Objective  /80 (BP Location: Left arm, Patient Position: Sitting, Cuff Size: Adult)   Pulse 63   Ht 175.3 cm (69.02\")   Wt 101 kg (222 lb)   SpO2 99%   BMI 32.77 kg/mý   Constitutional:       Appearance: Well-developed.   HENT:      Head: Normocephalic and atraumatic.   Pulmonary:      Effort: Pulmonary effort is normal.      Breath sounds: Normal breath " sounds.   Cardiovascular:      Normal rate. Regular rhythm.      Murmurs: There is no murmur.      No gallop.  No click.   Edema:     Peripheral edema absent.   Skin:     General: Skin is warm and dry.   Neurological:      Mental Status: Alert and oriented to person, place, and time.       ECG 12 Lead    Date/Time: 8/14/2023 11:58 AM  Performed by: Oswaldo Hill MD  Authorized by: Oswaldo Hill MD   Comparison: compared with previous ECG from 8/7/2023  Comparison to previous ECG: Sinus rhythm has replaced atrial fibrillation  Rhythm: sinus rhythm  Rate: normal  QRS axis: right      CHADS-VASc Risk Assessment              0 Total Score        Criteria that do not apply:    CHF    Hypertension    Age >/= 75    DM    PRIOR STROKE/TIA/THROMBO    Vascular Disease    Age 65-74    Sex: Female             ICD-10-CM ICD-9-CM   1. Paroxysmal atrial fibrillation  I48.0 427.31   2. Hypothyroidism, unspecified type  E03.9 244.9   3. KAILEY (obstructive sleep apnea)  G47.33 327.23         Assessment/Plan:  1.  Paroxysmal atrial fibrillation: Intermittent episodes of atrial fibrillation.  Was caught on EKG from 8/7/2023.  Normal sinus rhythm on EKG today.  Hypothyroid and sleep apnea are potential risk factors for this.  There is an association with his nutritional supplements.  Discontinue anticoagulation as [id number] is 0.  Reasonable to continue atenolol for now but may consider discontinuation as he wants to avoid medications if at all possible.  Ablation would be a good option for him if he had recurrent episodes despite stopping his supplements that appear to be a trigger.  Check an echo.    2.  Hypothyroid: Managed on NP thyroid 120 mg.  TSH was normal on 3/15/2023 and he reports it was also normal and more recent checked by his PCP.    3.  Obstructive sleep apnea: Patient is compliant with APAP.

## 2023-08-14 NOTE — PROGRESS NOTES
Reason for Visit: Atrial fibrillation.    HPI:  Garcia Greene is a 43 y.o. male is being seen for consultation today at the request of CARLIN Almaraz for assistance with atrial fibrillation.  EKG from 8/7/2023 showed atrial fibrillation with RVR.  He was started on atenolol and anticoagulation with Eliquis.  The atrial fibrillation came out of nowhere and resolved on it's own.  He has had 3 total episodes.  The only association he has found with these is a supplement he takes before working out.  He avoids caffeine as well as processed foods.  He does get a lot of sodium in his diet.  He is active and sweats a lot during the day.  His blood pressure has improved since increasing sodium.  He has otherwise felt healthy and well.  He exercises regularly.  He lifts 3 days a week and does cardio 2 days a week.  He uses APAP regularly for his sleep apnea.  He has not had any updated evaluation on this recently.  He reports thyroid levels have been normal.    Previous Cardiac Testing and Procedures:  -EKG (8/7/2023) atrial fibrillation with RVR    Lab data:  -TSH (3/15/2023) 3.86  -Lipid panel (3/15/2023) total cholesterol 235, HDL 24, , triglycerides 201  -BMP (8/7/2023) creatinine 1.73, GFR 49, potassium not calculated, sodium 139  -CBC (8/7/2023) WBC 6.8, hemoglobin 19.7, hematocrit 56.2%, platelets 207    Patient Active Problem List   Diagnosis    Right carpal tunnel syndrome    Hypothyroid    KAILEY (obstructive sleep apnea)       Social History     Tobacco Use    Smoking status: Never     Passive exposure: Never    Smokeless tobacco: Never   Vaping Use    Vaping Use: Never used   Substance Use Topics    Alcohol use: No    Drug use: No       History reviewed. No pertinent family history.    The following portions of the patient's history were reviewed and updated as appropriate: allergies, current medications, past family history, past medical history, past social history, past surgical history, and  "problem list.      Current Outpatient Medications:     ascorbic acid (VITAMIN C) 1000 MG tablet, , Disp: , Rfl:     atenolol (Tenormin) 25 MG tablet, Take 1 tablet by mouth Daily., Disp: 30 tablet, Rfl: 1    Cholecalciferol (VITAMIN D3) 5000 units capsule capsule, Take 1 capsule by mouth Daily., Disp: , Rfl:     Coenzyme Q10 (CoQ-10) 100 MG capsule, , Disp: , Rfl:     KRILL OIL PO, Take 700 mg by mouth Daily., Disp: , Rfl:     Magnesium 200 MG tablet, , Disp: , Rfl:     NP Thyroid 120 MG tablet, Take 1 tablet by mouth Daily., Disp: , Rfl:     Testosterone Cypionate (DEPOTESTOTERONE CYPIONATE) 200 MG/ML injection, , Disp: , Rfl:     Zinc 50 MG tablet, , Disp: , Rfl:     Review of Systems   Constitutional: Negative for chills and fever.   Cardiovascular:  Positive for irregular heartbeat and palpitations. Negative for chest pain and paroxysmal nocturnal dyspnea.   Respiratory:  Negative for cough and shortness of breath.    Skin:  Negative for rash.   Gastrointestinal:  Negative for abdominal pain and heartburn.   Neurological:  Negative for dizziness and numbness.     Objective   /80 (BP Location: Left arm, Patient Position: Sitting, Cuff Size: Adult)   Pulse 63   Ht 175.3 cm (69.02\")   Wt 101 kg (222 lb)   SpO2 99%   BMI 32.77 kg/mý   Constitutional:       Appearance: Well-developed.   HENT:      Head: Normocephalic and atraumatic.   Pulmonary:      Effort: Pulmonary effort is normal.      Breath sounds: Normal breath sounds.   Cardiovascular:      Normal rate. Regular rhythm.      Murmurs: There is no murmur.      No gallop.  No click.   Edema:     Peripheral edema absent.   Skin:     General: Skin is warm and dry.   Neurological:      Mental Status: Alert and oriented to person, place, and time.       ECG 12 Lead    Date/Time: 8/14/2023 11:58 AM  Performed by: Oswaldo Hill MD  Authorized by: Oswaldo Hill MD   Comparison: compared with previous ECG from 8/7/2023  Comparison to previous ECG: Sinus " rhythm has replaced atrial fibrillation  Rhythm: sinus rhythm  Rate: normal  QRS axis: right      CHADS-VASc Risk Assessment              0 Total Score        Criteria that do not apply:    CHF    Hypertension    Age >/= 75    DM    PRIOR STROKE/TIA/THROMBO    Vascular Disease    Age 65-74    Sex: Female             ICD-10-CM ICD-9-CM   1. Paroxysmal atrial fibrillation  I48.0 427.31   2. Hypothyroidism, unspecified type  E03.9 244.9   3. KAILEY (obstructive sleep apnea)  G47.33 327.23         Assessment/Plan:  1.  Paroxysmal atrial fibrillation: Intermittent episodes of atrial fibrillation.  Was caught on EKG from 8/7/2023.  Normal sinus rhythm on EKG today.  Hypothyroid and sleep apnea are potential risk factors for this.  There is an association with his nutritional supplements.  Discontinue anticoagulation as [id number] is 0.  Reasonable to continue atenolol for now but may consider discontinuation as he wants to avoid medications if at all possible.  Ablation would be a good option for him if he had recurrent episodes despite stopping his supplements that appear to be a trigger.  Check an echo.    2.  Hypothyroid: Managed on NP thyroid 120 mg.  TSH was normal on 3/15/2023 and he reports it was also normal and more recent checked by his PCP.    3.  Obstructive sleep apnea: Patient is compliant with APAP.

## 2023-09-11 ENCOUNTER — TELEPHONE (OUTPATIENT)
Dept: CARDIOLOGY | Facility: CLINIC | Age: 43
End: 2023-09-11
Payer: MEDICAID

## 2023-09-11 NOTE — TELEPHONE ENCOUNTER
This pt called and left a vm . He said he went back in afib over the weekend. We d/c Eliquis at his last appt.     I called him back but he does not have a vm. Please advise

## 2023-09-11 NOTE — TELEPHONE ENCOUNTER
The pt called back. He said on Wednesday, his heart went out of rhythm for about 4-6 hrs. Has episodes again this weekend. He feels tired and fatigue. He resume taking Eliquis this morning He has not taken atenolol since he was seen last here on 8/14. He will resume atenolol tonight. Please advise

## 2023-09-11 NOTE — TELEPHONE ENCOUNTER
Call returned to pt. His heart rate average are from 76 to 134 bpm. I advised him to call back Thursday to give amrik

## 2023-09-14 ENCOUNTER — TELEPHONE (OUTPATIENT)
Dept: CARDIOLOGY | Facility: CLINIC | Age: 43
End: 2023-09-14
Payer: MEDICAID

## 2023-09-14 NOTE — TELEPHONE ENCOUNTER
See note from 9/11/23    This pt called to give us update on his bp and heart rates. He resume eliquis and atenolol on 9/11    His bp's with average heart rates of 59-65    9/14/23- at 6:30 am-120/84, 117/90,131/80  9/13/23 @ 9:15 pm- 130/82, 123/85,123/76, @ noon 110/76  9/12/23 @ pm [id number]        He still feel as if his heart is out of rhythm. He still has tiredness and fatigue. I made him an appt tomorrow to see us. He also wants to know if he can go to the gym before his appt?

## 2023-09-15 ENCOUNTER — OFFICE VISIT (OUTPATIENT)
Dept: CARDIOLOGY | Facility: CLINIC | Age: 43
End: 2023-09-15
Payer: MEDICAID

## 2023-09-15 VITALS
DIASTOLIC BLOOD PRESSURE: 86 MMHG | WEIGHT: 221 LBS | SYSTOLIC BLOOD PRESSURE: 120 MMHG | OXYGEN SATURATION: 99 % | HEIGHT: 69 IN | HEART RATE: 54 BPM | BODY MASS INDEX: 32.73 KG/M2

## 2023-09-15 DIAGNOSIS — D45 POLYCYTHEMIA VERA: ICD-10-CM

## 2023-09-15 DIAGNOSIS — I48.0 PAROXYSMAL ATRIAL FIBRILLATION: Primary | ICD-10-CM

## 2023-09-15 DIAGNOSIS — G47.33 OSA (OBSTRUCTIVE SLEEP APNEA): ICD-10-CM

## 2023-09-15 DIAGNOSIS — E03.9 HYPOTHYROIDISM, UNSPECIFIED TYPE: ICD-10-CM

## 2023-09-15 PROBLEM — D75.1 POLYCYTHEMIA: Status: ACTIVE | Noted: 2023-09-15

## 2023-09-15 PROCEDURE — 99214 OFFICE O/P EST MOD 30 MIN: CPT | Performed by: INTERNAL MEDICINE

## 2023-09-15 PROCEDURE — 1160F RVW MEDS BY RX/DR IN RCRD: CPT | Performed by: INTERNAL MEDICINE

## 2023-09-15 PROCEDURE — 93000 ELECTROCARDIOGRAM COMPLETE: CPT | Performed by: INTERNAL MEDICINE

## 2023-09-15 PROCEDURE — 1159F MED LIST DOCD IN RCRD: CPT | Performed by: INTERNAL MEDICINE

## 2023-09-15 RX ORDER — FLECAINIDE ACETATE 50 MG/1
50 TABLET ORAL 2 TIMES DAILY
Qty: 60 TABLET | Refills: 11 | Status: SHIPPED | OUTPATIENT
Start: 2023-09-15

## 2023-09-15 NOTE — PROGRESS NOTES
Reason for Visit: cardiovascular follow up.    HPI:  Garcia Greene is a 43 y.o. male is here today for follow-up.  He was seen in cardiology consultation on 8/14/2023 for assistance with management of atrial fibrillation.  Atrial fibrillation was initially diagnosed on an EKG from 8/7/2023 and spontaneously converted back to sinus rhythm.  Echo was ordered last visit and is scheduled on 9/21/2023.  He is now having recurrent episodes.  Most of them last a few minutes, but he has had some episodes up to 20 minutes.  He has started taking the atenolol regularly along with Eliquis.    Previous Cardiac Testing and Procedures:  -EKG (8/7/2023) atrial fibrillation with RVR     Lab data:  -TSH (3/15/2023) 3.86  -Lipid panel (3/15/2023) total cholesterol 235, HDL 24, , triglycerides 201  -BMP (8/7/2023) creatinine 1.73, GFR 49, potassium not calculated, sodium 139  -CBC (8/7/2023) WBC 6.8, hemoglobin 19.7, hematocrit 56.2%, platelets 207    Patient Active Problem List   Diagnosis    Right carpal tunnel syndrome    Hypothyroid    KAILEY (obstructive sleep apnea)    Paroxysmal atrial fibrillation    Polycythemia vera       Social History     Tobacco Use    Smoking status: Never     Passive exposure: Never    Smokeless tobacco: Never   Vaping Use    Vaping Use: Never used   Substance Use Topics    Alcohol use: No    Drug use: No       History reviewed. No pertinent family history.    The following portions of the patient's history were reviewed and updated as appropriate: allergies, current medications, past family history, past medical history, past social history, past surgical history, and problem list.      Current Outpatient Medications:     apixaban (ELIQUIS) 5 MG tablet tablet, Take 1 tablet by mouth 2 (Two) Times a Day., Disp: 60 tablet, Rfl: 11    ascorbic acid (VITAMIN C) 1000 MG tablet, , Disp: , Rfl:     atenolol (Tenormin) 25 MG tablet, Take 1 tablet by mouth Daily., Disp: 30 tablet, Rfl: 1    Cholecalciferol  "(VITAMIN D3) 5000 units capsule capsule, Take 1 capsule by mouth Daily., Disp: , Rfl:     Coenzyme Q10 (CoQ-10) 100 MG capsule, , Disp: , Rfl:     KRILL OIL PO, Take 700 mg by mouth Daily., Disp: , Rfl:     Magnesium 200 MG tablet, , Disp: , Rfl:     NP Thyroid 120 MG tablet, Take 1 tablet by mouth Daily., Disp: , Rfl:     Testosterone Cypionate (DEPOTESTOTERONE CYPIONATE) 200 MG/ML injection, , Disp: , Rfl:     Zinc 50 MG tablet, , Disp: , Rfl:     flecainide (TAMBOCOR) 50 MG tablet, Take 1 tablet by mouth 2 (Two) Times a Day., Disp: 60 tablet, Rfl: 11    Review of Systems   Constitutional: Negative for chills and fever.   Cardiovascular:  Positive for irregular heartbeat and palpitations. Negative for chest pain and paroxysmal nocturnal dyspnea.   Respiratory:  Negative for cough and shortness of breath.    Skin:  Negative for rash.   Gastrointestinal:  Negative for abdominal pain and heartburn.   Neurological:  Negative for dizziness and numbness.     Objective   /86 (BP Location: Left arm, Patient Position: Sitting, Cuff Size: Adult)   Pulse 54   Ht 175.3 cm (69.02\")   Wt 100 kg (221 lb)   SpO2 99%   BMI 32.62 kg/m²   Constitutional:       Appearance: Well-developed.   HENT:      Head: Normocephalic and atraumatic.   Pulmonary:      Effort: Pulmonary effort is normal.      Breath sounds: Normal breath sounds.   Cardiovascular:      Bradycardia present. Regular rhythm.      Murmurs: There is no murmur.      No gallop.  No click.   Edema:     Peripheral edema absent.   Skin:     General: Skin is warm and dry.   Neurological:      Mental Status: Alert and oriented to person, place, and time.       ECG 12 Lead    Date/Time: 9/15/2023 8:34 AM  Performed by: Oswaldo Hill MD  Authorized by: Oswaldo Hill MD   Comparison: compared with previous ECG from 8/14/2023  Similar to previous ECG  Rhythm: sinus bradycardia  QRS axis: right          ICD-10-CM ICD-9-CM   1. Paroxysmal atrial fibrillation  I48.0 " 427.31   2. Hypothyroidism, unspecified type  E03.9 244.9   3. KAILEY (obstructive sleep apnea)  G47.33 327.23   4. Polycythemia vera  D45 238.4     CHADS-VASc Risk Assessment              0 Total Score        Criteria that do not apply:    CHF    Hypertension    Age >/= 75    DM    PRIOR STROKE/TIA/THROMBO    Vascular Disease    Age 65-74    Sex: Female             Assessment/Plan:  1. Paroxysmal atrial fibrillation: Seen on EKG from 8/7/2023.  He remains in sinus rhythm today.  Continues to have intermittent episodes at home.  Polycythemia vera could be contributing.  Continue atenolol and start antiarrhythmic therapy with flecainide.  Echo pending on 9/21/2023.  He prefers not to take medications long-term and is interested in considering ablation.  Will refer to EP for further evaluation and management.  [id number] is 0, so long-term anticoagulation is not likely necessary but reasonable to continue for now.     2.  Hypothyroid: Managed on NP thyroid 120 mg with normal TSH on 3/15/2023.     3.  Obstructive sleep apnea: Continue APAP.    4.  Polycythemia vera: Hemoglobin of 19.7 g/Forrest and hematocrit of 56.2% on CBC from 8/7/2023.  This is an independent risk factor for atrial fibrillation.

## 2023-09-15 NOTE — LETTER
September 15, 2023     CARLIN Almaraz  [address]  [place] [address]    Patient: Garcia Greene   YOB: 1980   Date of Visit: 9/15/2023       Dear CARLIN Almaraz    Garcia Greene was in my office today. Below is a copy of my note.    If you have questions, please do not hesitate to call me. I look forward to following Garcia along with you.         Sincerely,        Oswaldo Hill MD        CC: No Recipients      Reason for Visit: cardiovascular follow up.    HPI:  Garcia Greene is a 43 y.o. male is here today for follow-up.  He was seen in cardiology consultation on 8/14/2023 for assistance with management of atrial fibrillation.  Atrial fibrillation was initially diagnosed on an EKG from 8/7/2023 and spontaneously converted back to sinus rhythm.  Echo was ordered last visit and is scheduled on 9/21/2023.  He is now having recurrent episodes.  Most of them last a few minutes, but he has had some episodes up to 20 minutes.  He has started taking the atenolol regularly along with Eliquis.    Previous Cardiac Testing and Procedures:  -EKG (8/7/2023) atrial fibrillation with RVR     Lab data:  -TSH (3/15/2023) 3.86  -Lipid panel (3/15/2023) total cholesterol 235, HDL 24, , triglycerides 201  -BMP (8/7/2023) creatinine 1.73, GFR 49, potassium not calculated, sodium 139  -CBC (8/7/2023) WBC 6.8, hemoglobin 19.7, hematocrit 56.2%, platelets 207    Patient Active Problem List   Diagnosis   • Right carpal tunnel syndrome   • Hypothyroid   • KAILEY (obstructive sleep apnea)   • Paroxysmal atrial fibrillation   • Polycythemia vera       Social History     Tobacco Use   • Smoking status: Never     Passive exposure: Never   • Smokeless tobacco: Never   Vaping Use   • Vaping Use: Never used   Substance Use Topics   • Alcohol use: No   • Drug use: No       History reviewed. No pertinent family history.    The following portions of the patient's history were reviewed and updated as appropriate:  "allergies, current medications, past family history, past medical history, past social history, past surgical history, and problem list.      Current Outpatient Medications:   •  apixaban (ELIQUIS) 5 MG tablet tablet, Take 1 tablet by mouth 2 (Two) Times a Day., Disp: 60 tablet, Rfl: 11  •  ascorbic acid (VITAMIN C) 1000 MG tablet, , Disp: , Rfl:   •  atenolol (Tenormin) 25 MG tablet, Take 1 tablet by mouth Daily., Disp: 30 tablet, Rfl: 1  •  Cholecalciferol (VITAMIN D3) 5000 units capsule capsule, Take 1 capsule by mouth Daily., Disp: , Rfl:   •  Coenzyme Q10 (CoQ-10) 100 MG capsule, , Disp: , Rfl:   •  KRILL OIL PO, Take 700 mg by mouth Daily., Disp: , Rfl:   •  Magnesium 200 MG tablet, , Disp: , Rfl:   •  NP Thyroid 120 MG tablet, Take 1 tablet by mouth Daily., Disp: , Rfl:   •  Testosterone Cypionate (DEPOTESTOTERONE CYPIONATE) 200 MG/ML injection, , Disp: , Rfl:   •  Zinc 50 MG tablet, , Disp: , Rfl:   •  flecainide (TAMBOCOR) 50 MG tablet, Take 1 tablet by mouth 2 (Two) Times a Day., Disp: 60 tablet, Rfl: 11    Review of Systems   Constitutional: Negative for chills and fever.   Cardiovascular:  Positive for irregular heartbeat and palpitations. Negative for chest pain and paroxysmal nocturnal dyspnea.   Respiratory:  Negative for cough and shortness of breath.    Skin:  Negative for rash.   Gastrointestinal:  Negative for abdominal pain and heartburn.   Neurological:  Negative for dizziness and numbness.     Objective  /86 (BP Location: Left arm, Patient Position: Sitting, Cuff Size: Adult)   Pulse 54   Ht 175.3 cm (69.02\")   Wt 100 kg (221 lb)   SpO2 99%   BMI 32.62 kg/m²   Constitutional:       Appearance: Well-developed.   HENT:      Head: Normocephalic and atraumatic.   Pulmonary:      Effort: Pulmonary effort is normal.      Breath sounds: Normal breath sounds.   Cardiovascular:      Bradycardia present. Regular rhythm.      Murmurs: There is no murmur.      No gallop.  No click.   Edema:     " Peripheral edema absent.   Skin:     General: Skin is warm and dry.   Neurological:      Mental Status: Alert and oriented to person, place, and time.       ECG 12 Lead    Date/Time: 9/15/2023 8:34 AM  Performed by: Oswaldo Hill MD  Authorized by: Oswaldo Hill MD   Comparison: compared with previous ECG from 8/14/2023  Similar to previous ECG  Rhythm: sinus bradycardia  QRS axis: right          ICD-10-CM ICD-9-CM   1. Paroxysmal atrial fibrillation  I48.0 427.31   2. Hypothyroidism, unspecified type  E03.9 244.9   3. KAILEY (obstructive sleep apnea)  G47.33 327.23   4. Polycythemia vera  D45 238.4     CHADS-VASc Risk Assessment              0 Total Score        Criteria that do not apply:    CHF    Hypertension    Age >/= 75    DM    PRIOR STROKE/TIA/THROMBO    Vascular Disease    Age 65-74    Sex: Female             Assessment/Plan:  1. Paroxysmal atrial fibrillation: Seen on EKG from 8/7/2023.  He remains in sinus rhythm today.  Continues to have intermittent episodes at home.  Polycythemia vera could be contributing.  Continue atenolol and start antiarrhythmic therapy with flecainide.  Echo pending on 9/21/2023.  He prefers not to take medications long-term and is interested in considering ablation.  Will refer to EP for further evaluation and management.  [id number] is 0, so long-term anticoagulation is not likely necessary but reasonable to continue for now.     2.  Hypothyroid: Managed on NP thyroid 120 mg with normal TSH on 3/15/2023.     3.  Obstructive sleep apnea: Continue APAP.    4.  Polycythemia vera: Hemoglobin of 19.7 g/Forrest and hematocrit of 56.2% on CBC from 8/7/2023.  This is an independent risk factor for atrial fibrillation.

## 2023-09-21 ENCOUNTER — HOSPITAL ENCOUNTER (OUTPATIENT)
Dept: CARDIOLOGY | Facility: HOSPITAL | Age: 43
Discharge: HOME | End: 2023-09-21
Admitting: INTERNAL MEDICINE
Payer: MEDICAID

## 2023-09-21 VITALS
DIASTOLIC BLOOD PRESSURE: 86 MMHG | BODY MASS INDEX: 32.73 KG/M2 | SYSTOLIC BLOOD PRESSURE: 120 MMHG | WEIGHT: 221 LBS | HEIGHT: 69 IN

## 2023-09-21 LAB
BH CV ECHO MEAS - AO MAX PG: 9 MMHG
BH CV ECHO MEAS - AO MEAN PG: 3.8 MMHG
BH CV ECHO MEAS - AO ROOT DIAM: 2.8 CM
BH CV ECHO MEAS - AO V2 MAX: 150 CM/SEC
BH CV ECHO MEAS - AO V2 VTI: 27.1 CM
BH CV ECHO MEAS - AVA(I,D): 2.6 CM2
BH CV ECHO MEAS - EDV(CUBED): 140.6 ML
BH CV ECHO MEAS - EDV(MOD-SP2): 93.5 ML
BH CV ECHO MEAS - EDV(MOD-SP4): 91 ML
BH CV ECHO MEAS - EF(MOD-BP): 58 %
BH CV ECHO MEAS - EF(MOD-SP2): 57.2 %
BH CV ECHO MEAS - EF(MOD-SP4): 60.2 %
BH CV ECHO MEAS - ESV(CUBED): 32.8 ML
BH CV ECHO MEAS - ESV(MOD-SP2): 40 ML
BH CV ECHO MEAS - ESV(MOD-SP4): 36.2 ML
BH CV ECHO MEAS - FS: 38.5 %
BH CV ECHO MEAS - IVS/LVPW: 0.82 CM
BH CV ECHO MEAS - IVSD: 0.9 CM
BH CV ECHO MEAS - LA DIMENSION: 2.9 CM
BH CV ECHO MEAS - LAT PEAK E' VEL: 13.6 CM/SEC
BH CV ECHO MEAS - LV DIASTOLIC VOL/BSA (35-75): 42.2 CM2
BH CV ECHO MEAS - LV MASS(C)D: 194.2 GRAMS
BH CV ECHO MEAS - LV MAX PG: 4.1 MMHG
BH CV ECHO MEAS - LV MEAN PG: 2 MMHG
BH CV ECHO MEAS - LV SYSTOLIC VOL/BSA (12-30): 16.8 CM2
BH CV ECHO MEAS - LV V1 MAX: 100.7 CM/SEC
BH CV ECHO MEAS - LV V1 VTI: 20.4 CM
BH CV ECHO MEAS - LVIDD: 5.2 CM
BH CV ECHO MEAS - LVIDS: 3.2 CM
BH CV ECHO MEAS - LVOT AREA: 3.5 CM2
BH CV ECHO MEAS - LVOT DIAM: 2.1 CM
BH CV ECHO MEAS - LVPWD: 1.1 CM
BH CV ECHO MEAS - MED PEAK E' VEL: 8.1 CM/SEC
BH CV ECHO MEAS - MR MAX PG: 6.8 MMHG
BH CV ECHO MEAS - MR MAX VEL: 130 CM/SEC
BH CV ECHO MEAS - MV A MAX VEL: 50.2 CM/SEC
BH CV ECHO MEAS - MV DEC TIME: 0.2 SEC
BH CV ECHO MEAS - MV E MAX VEL: 60.9 CM/SEC
BH CV ECHO MEAS - MV E/A: 1.21
BH CV ECHO MEAS - PA V2 MAX: 106 CM/SEC
BH CV ECHO MEAS - SI(MOD-SP2): 24.8 ML/M2
BH CV ECHO MEAS - SI(MOD-SP4): 25.4 ML/M2
BH CV ECHO MEAS - SV(LVOT): 70.7 ML
BH CV ECHO MEAS - SV(MOD-SP2): 53.5 ML
BH CV ECHO MEAS - SV(MOD-SP4): 54.8 ML
BH CV ECHO MEAS - TAPSE (>1.6): 2.28 CM
BH CV ECHO MEASUREMENTS AVERAGE E/E' RATIO: 5.61
BH CV XLRA - RV BASE: 4.1 CM
BH CV XLRA - TDI S': 13.1 CM/SEC
LEFT ATRIUM VOLUME INDEX: 19.3 ML/M2
LEFT ATRIUM VOLUME: 41.6 ML

## 2023-09-21 PROCEDURE — 93306 TTE W/DOPPLER COMPLETE: CPT

## 2023-09-22 ENCOUNTER — TELEPHONE (OUTPATIENT)
Dept: CARDIOLOGY | Facility: CLINIC | Age: 43
End: 2023-09-22
Payer: MEDICAID

## 2023-09-22 NOTE — TELEPHONE ENCOUNTER
----- Message from Oswaldo iHll MD sent at 9/21/2023  6:46 PM CDT -----  Please let him know that the echo shows normal cardiac structure and function is well as an incidental finding of a liver cyst.

## 2023-10-05 DIAGNOSIS — I48.91 ATRIAL FIBRILLATION, UNSPECIFIED TYPE: ICD-10-CM

## 2023-10-05 RX ORDER — ATENOLOL 25 MG/1
25 TABLET ORAL DAILY
Qty: 30 TABLET | Refills: 1 | Status: SHIPPED | OUTPATIENT
Start: 2023-10-05

## 2023-10-19 DIAGNOSIS — R19.7 DIARRHEA OF PRESUMED INFECTIOUS ORIGIN: Primary | ICD-10-CM

## 2023-10-20 ENCOUNTER — LAB (OUTPATIENT)
Dept: INTERNAL MEDICINE | Facility: CLINIC | Age: 43
End: 2023-10-20
Payer: MEDICAID

## 2023-10-20 ENCOUNTER — OFFICE VISIT (OUTPATIENT)
Dept: CARDIOLOGY | Facility: CLINIC | Age: 43
End: 2023-10-20
Payer: MEDICAID

## 2023-10-20 VITALS
OXYGEN SATURATION: 98 % | WEIGHT: 222 LBS | DIASTOLIC BLOOD PRESSURE: 80 MMHG | HEART RATE: 58 BPM | HEIGHT: 69 IN | SYSTOLIC BLOOD PRESSURE: 112 MMHG | BODY MASS INDEX: 32.88 KG/M2

## 2023-10-20 DIAGNOSIS — R19.7 DIARRHEA OF PRESUMED INFECTIOUS ORIGIN: ICD-10-CM

## 2023-10-20 DIAGNOSIS — D45 POLYCYTHEMIA VERA: ICD-10-CM

## 2023-10-20 DIAGNOSIS — I48.0 PAROXYSMAL ATRIAL FIBRILLATION: Primary | ICD-10-CM

## 2023-10-20 DIAGNOSIS — G47.33 OSA (OBSTRUCTIVE SLEEP APNEA): ICD-10-CM

## 2023-10-20 PROCEDURE — 99214 OFFICE O/P EST MOD 30 MIN: CPT | Performed by: INTERNAL MEDICINE

## 2023-10-20 PROCEDURE — 93000 ELECTROCARDIOGRAM COMPLETE: CPT | Performed by: INTERNAL MEDICINE

## 2023-10-20 NOTE — PROGRESS NOTES
Reason for Visit: cardiovascular follow up.    HPI:  Garcia Greene is a 43 y.o. male is here today for follow-up.  He follows in cardiology clinic secondary to paroxysmal atrial fibrillation.  Echo from 9/21/2023 showed normal cardiac structure and function with incidental finding of a liver cyst.  He has polycythemia vera managed by phlebotomy, but has not been been to donate in a while.  CBC from 8/7/2023 showed hemoglobin 19.7 with hematocrit of 56.2%.  He has not had an episode since 9/13.  He is no longer sure he wants to consider ablation but plans to keep EP appointment in December.  He has an upcoming appointment with the EP in December.  He decided to stop taking Eliquis after recent titrating it further.  He never started flecainide.  He has been compliant with APAP.      Previous Cardiac Testing and Procedures:  -EKG (8/7/2023) atrial fibrillation with RVR  -Echo (9/21/2023) EF 56-60%, normal diastolic function, normal RV size and function, normal valves, liver cyst     Lab data:  -TSH (3/15/2023) 3.86  -Lipid panel (3/15/2023) total cholesterol 235, HDL 24, , triglycerides 201  -BMP (8/7/2023) creatinine 1.73, GFR 49, potassium not calculated, sodium 139  -CBC (8/7/2023) WBC 6.8, hemoglobin 19.7, hematocrit 56.2%, platelets 207    Patient Active Problem List   Diagnosis    Right carpal tunnel syndrome    Hypothyroid    KAILEY (obstructive sleep apnea)    Paroxysmal atrial fibrillation    Polycythemia vera       Social History     Tobacco Use    Smoking status: Never     Passive exposure: Never    Smokeless tobacco: Never   Vaping Use    Vaping Use: Never used   Substance Use Topics    Alcohol use: No    Drug use: No       No family history on file.    The following portions of the patient's history were reviewed and updated as appropriate: allergies, current medications, past family history, past medical history, past social history, past surgical history, and problem list.      Current Outpatient  "Medications:     ascorbic acid (VITAMIN C) 1000 MG tablet, , Disp: , Rfl:     atenolol (Tenormin) 25 MG tablet, Take 1 tablet by mouth Daily., Disp: 30 tablet, Rfl: 1    Cholecalciferol (VITAMIN D3) 5000 units capsule capsule, Take 1 capsule by mouth Daily., Disp: , Rfl:     Coenzyme Q10 (CoQ-10) 100 MG capsule, , Disp: , Rfl:     KRILL OIL PO, Take 700 mg by mouth Daily., Disp: , Rfl:     Magnesium 200 MG tablet, , Disp: , Rfl:     NP Thyroid 120 MG tablet, Take 1 tablet by mouth Daily., Disp: , Rfl:     Testosterone Cypionate (DEPOTESTOTERONE CYPIONATE) 200 MG/ML injection, , Disp: , Rfl:     Zinc 50 MG tablet, , Disp: , Rfl:     Review of Systems   Constitutional: Negative for chills and fever.   Cardiovascular:  Negative for chest pain, irregular heartbeat and paroxysmal nocturnal dyspnea.   Respiratory:  Negative for cough and shortness of breath.    Skin:  Negative for rash.   Gastrointestinal:  Negative for abdominal pain and heartburn.   Neurological:  Negative for dizziness and numbness.       Objective   /80 (BP Location: Left arm, Patient Position: Sitting, Cuff Size: Adult)   Pulse 58   Ht 175.3 cm (69.02\")   Wt 101 kg (222 lb)   SpO2 98%   BMI 32.77 kg/m²   Constitutional:       Appearance: Well-developed.   HENT:      Head: Normocephalic and atraumatic.   Pulmonary:      Effort: Pulmonary effort is normal.      Breath sounds: Normal breath sounds.   Cardiovascular:      Normal rate. Regular rhythm.      Murmurs: There is no murmur.      No gallop.  No click.   Edema:     Peripheral edema absent.   Skin:     General: Skin is warm and dry.   Neurological:      Mental Status: Alert and oriented to person, place, and time.         ECG 12 Lead    Date/Time: 10/20/2023 9:55 AM  Performed by: Oswaldo Hill MD    Authorized by: Oswaldo Hill MD  Comparison: compared with previous ECG from 9/15/2023  Similar to previous ECG  Rhythm: sinus bradycardia  QRS axis: right            ICD-10-CM " ICD-9-CM   1. Paroxysmal atrial fibrillation  I48.0 427.31   2. KAILEY (obstructive sleep apnea)  G47.33 327.23   3. Polycythemia vera  D45 238.4         Assessment/Plan:  1.  Paroxysmal atrial fibrillation: Diagnosed on EKG from 8/7/2023.  Sinus rhythm on EKG today.  No episodes of atrial fibrillation or other palpitations since 9/13/2023.  He would like to avoid medications long-term.  He is currently just taking atenolol.  [id number] is 0, Eliquis previously stopped.  Upcoming appointment with Dr Salmon of EP on 12/1/2023.     2.  Obstructive sleep apnea: Compliant with APAP.     3.  Polycythemia vera: Hemoglobin of 19.7 g/Forrest and hematocrit of 56.2% on CBC from 8/7/2023.  Encouraged him to resume regular phlebotomy given association with atrial fibrillation.

## 2023-10-20 NOTE — LETTER
October 20, 2023     CARLIN Almaraz  [address]  [place] [address]    Patient: Garcia Greene   YOB: 1980   Date of Visit: 10/20/2023       Dear CARLIN Almaraz    Garcia Greene was in my office today. Below is a copy of my note.    If you have questions, please do not hesitate to call me. I look forward to following Garcia along with you.         Sincerely,        Oswaldo Hill MD        CC: No Recipients      Reason for Visit: cardiovascular follow up.    HPI:  Garcia Greene is a 43 y.o. male is here today for follow-up.  He follows in cardiology clinic secondary to paroxysmal atrial fibrillation.  Echo from 9/21/2023 showed normal cardiac structure and function with incidental finding of a liver cyst.  He has polycythemia vera managed by phlebotomy, but has not been been to donate in a while.  CBC from 8/7/2023 showed hemoglobin 19.7 with hematocrit of 56.2%.  He has not had an episode since 9/13.  He is no longer sure he wants to consider ablation but plans to keep EP appointment in December.  He has an upcoming appointment with the EP in December.  He decided to stop taking Eliquis after recent titrating it further.  He never started flecainide.  He has been compliant with APAP.      Previous Cardiac Testing and Procedures:  -EKG (8/7/2023) atrial fibrillation with RVR  -Echo (9/21/2023) EF 56-60%, normal diastolic function, normal RV size and function, normal valves, liver cyst     Lab data:  -TSH (3/15/2023) 3.86  -Lipid panel (3/15/2023) total cholesterol 235, HDL 24, , triglycerides 201  -BMP (8/7/2023) creatinine 1.73, GFR 49, potassium not calculated, sodium 139  -CBC (8/7/2023) WBC 6.8, hemoglobin 19.7, hematocrit 56.2%, platelets 207    Patient Active Problem List   Diagnosis   • Right carpal tunnel syndrome   • Hypothyroid   • KAILEY (obstructive sleep apnea)   • Paroxysmal atrial fibrillation   • Polycythemia vera       Social History     Tobacco Use   • Smoking  "status: Never     Passive exposure: Never   • Smokeless tobacco: Never   Vaping Use   • Vaping Use: Never used   Substance Use Topics   • Alcohol use: No   • Drug use: No       No family history on file.    The following portions of the patient's history were reviewed and updated as appropriate: allergies, current medications, past family history, past medical history, past social history, past surgical history, and problem list.      Current Outpatient Medications:   •  ascorbic acid (VITAMIN C) 1000 MG tablet, , Disp: , Rfl:   •  atenolol (Tenormin) 25 MG tablet, Take 1 tablet by mouth Daily., Disp: 30 tablet, Rfl: 1  •  Cholecalciferol (VITAMIN D3) 5000 units capsule capsule, Take 1 capsule by mouth Daily., Disp: , Rfl:   •  Coenzyme Q10 (CoQ-10) 100 MG capsule, , Disp: , Rfl:   •  KRILL OIL PO, Take 700 mg by mouth Daily., Disp: , Rfl:   •  Magnesium 200 MG tablet, , Disp: , Rfl:   •  NP Thyroid 120 MG tablet, Take 1 tablet by mouth Daily., Disp: , Rfl:   •  Testosterone Cypionate (DEPOTESTOTERONE CYPIONATE) 200 MG/ML injection, , Disp: , Rfl:   •  Zinc 50 MG tablet, , Disp: , Rfl:     Review of Systems   Constitutional: Negative for chills and fever.   Cardiovascular:  Negative for chest pain, irregular heartbeat and paroxysmal nocturnal dyspnea.   Respiratory:  Negative for cough and shortness of breath.    Skin:  Negative for rash.   Gastrointestinal:  Negative for abdominal pain and heartburn.   Neurological:  Negative for dizziness and numbness.       Objective  /80 (BP Location: Left arm, Patient Position: Sitting, Cuff Size: Adult)   Pulse 58   Ht 175.3 cm (69.02\")   Wt 101 kg (222 lb)   SpO2 98%   BMI 32.77 kg/m²   Constitutional:       Appearance: Well-developed.   HENT:      Head: Normocephalic and atraumatic.   Pulmonary:      Effort: Pulmonary effort is normal.      Breath sounds: Normal breath sounds.   Cardiovascular:      Normal rate. Regular rhythm.      Murmurs: There is no murmur.    "   No gallop.  No click.   Edema:     Peripheral edema absent.   Skin:     General: Skin is warm and dry.   Neurological:      Mental Status: Alert and oriented to person, place, and time.         ECG 12 Lead    Date/Time: 10/20/2023 9:55 AM  Performed by: Oswaldo Hill MD    Authorized by: Oswaldo Hill MD  Comparison: compared with previous ECG from 9/15/2023  Similar to previous ECG  Rhythm: sinus bradycardia  QRS axis: right            ICD-10-CM ICD-9-CM   1. Paroxysmal atrial fibrillation  I48.0 427.31   2. KAILEY (obstructive sleep apnea)  G47.33 327.23   3. Polycythemia vera  D45 238.4         Assessment/Plan:  1.  Paroxysmal atrial fibrillation: Diagnosed on EKG from 8/7/2023.  Sinus rhythm on EKG today.  No episodes of atrial fibrillation or other palpitations since 9/13/2023.  He would like to avoid medications long-term.  He is currently just taking atenolol.  [id number] is 0, Eliquis previously stopped.  Upcoming appointment with Dr Salmon of EP on 12/1/2023.     2.  Obstructive sleep apnea: Compliant with APAP.     3.  Polycythemia vera: Hemoglobin of 19.7 g/Forrest and hematocrit of 56.2% on CBC from 8/7/2023.  Encouraged him to resume regular phlebotomy given association with atrial fibrillation.

## 2023-10-22 LAB

## 2023-11-01 DIAGNOSIS — I48.91 ATRIAL FIBRILLATION, UNSPECIFIED TYPE: ICD-10-CM

## 2023-11-01 RX ORDER — ATENOLOL 25 MG/1
25 TABLET ORAL DAILY
Qty: 90 TABLET | Refills: 1 | Status: SHIPPED | OUTPATIENT
Start: 2023-11-01

## 2023-11-01 NOTE — TELEPHONE ENCOUNTER
Rx Refill Note  Requested Prescriptions     Pending Prescriptions Disp Refills    atenolol (TENORMIN) 25 MG tablet [Pharmacy Med Name: ATENOLOL 25 MG TABLET] 30 tablet 1     Sig: TAKE 1 TABLET BY MOUTH EVERY DAY      Last office visit with prescribing clinician: 8/7/2023   Last telemedicine visit with prescribing clinician: 5/25/2023   Next office visit with prescribing clinician: Visit date not found       {TIP  Please add Last Relevant Lab 8/7/23    Hellen Hartmann MA  11/01/23, 07:48 CDT

## 2023-12-01 ENCOUNTER — OFFICE VISIT (OUTPATIENT)
Dept: CARDIOLOGY | Facility: CLINIC | Age: 43
End: 2023-12-01
Payer: MEDICAID

## 2023-12-01 VITALS
WEIGHT: 226 LBS | SYSTOLIC BLOOD PRESSURE: 120 MMHG | HEIGHT: 69 IN | OXYGEN SATURATION: 98 % | BODY MASS INDEX: 33.47 KG/M2 | HEART RATE: 62 BPM | DIASTOLIC BLOOD PRESSURE: 78 MMHG | RESPIRATION RATE: 18 BRPM

## 2023-12-01 DIAGNOSIS — I48.0 PAROXYSMAL ATRIAL FIBRILLATION: Primary | ICD-10-CM

## 2023-12-01 NOTE — PROGRESS NOTES
"EP NEW PATIENT VISIT    Chief Complaint  Atrial Fibrillation (NP)    Subjective        History of Present Illness    EP Problems:  1.  Paroxysmal atrial fibrillation    Medical Problems:  1.  Polycythemia vera  2.  Hypothyroidism  3.  Hypogonadism  4.  Obstructive sleep apnea    Garcia Greene is a 43 y.o. male with problem list as above who presents to the clinic for evaluation of paroxysmal atrial fibrillation.  He was diagnosed with atrial fibrillation earlier this year after having multiple episodes of atrial fibrillation.  There was some concern that this may have been due to supplementation for bodybuilding.  He discontinued this supplement though continues to have episodes lasting up to 20 minutes in duration causing symptoms.  He was ultimately seen in clinic by Dr. Hill and started on flecainide, however he did not take this due to concerns about side effects.  His last episode of atrial fibrillation was reportedly in September and denies any significant episodes since that time.  He is now off of anticoagulation and stopped atenolol given lack of recurrent episodes.    Objective   Vital Signs:  /78 (BP Location: Right arm, Patient Position: Sitting)   Pulse 62   Resp 18   Ht 175.3 cm (69\")   Wt 103 kg (226 lb)   SpO2 98%   BMI 33.37 kg/m²   Estimated body mass index is 33.37 kg/m² as calculated from the following:    Height as of this encounter: 175.3 cm (69\").    Weight as of this encounter: 103 kg (226 lb).      Physical Exam  Vitals reviewed.   Constitutional:       Appearance: Normal appearance.   HENT:      Head: Normocephalic and atraumatic.   Eyes:      Extraocular Movements: Extraocular movements intact.      Conjunctiva/sclera: Conjunctivae normal.   Cardiovascular:      Rate and Rhythm: Normal rate and regular rhythm.      Pulses: Normal pulses.      Heart sounds: Normal heart sounds.   Pulmonary:      Effort: Pulmonary effort is normal.      Breath sounds: Normal breath sounds. "   Musculoskeletal:         General: No swelling.   Neurological:      General: No focal deficit present.      Mental Status: He is alert and oriented to person, place, and time.   Psychiatric:         Mood and Affect: Mood normal.         Judgment: Judgment normal.        Result Review :  The following data was reviewed by: Rafal Salmon MD on 12/01/2023:  CMP          3/15/2023    10:00 8/7/2023    15:31   CMP   Glucose 92  CANCELED    BUN 14  25    Creatinine 1.42  1.73    Sodium 138  139    Potassium 3.9  CANCELED    Chloride 98  98    Calcium 9.3  9.5    Total Protein 6.9  7.1    Albumin 4.3  4.9    Globulin 2.6  2.2    Total Bilirubin 0.8  0.4    Alkaline Phosphatase 85  83    AST (SGOT) 49  28    ALT (SGPT) 87  33    BUN/Creatinine Ratio 10  14      CBC          3/15/2023    10:00 8/7/2023    15:31   CBC   WBC 4.3  6.8    RBC 6.70  6.40    Hemoglobin 19.7  19.7    Hematocrit 56.8  56.2    MCV 85  88    MCH 29.4  30.8    MCHC 34.7  35.1    RDW 12.5  14.6    Platelets 212  207      TSH          3/15/2023    10:00   TSH   TSH 3.860      Previous echocardiogram reviewed: Normal LV and RV size and function    [id number] SCORE   [id number] Score: 0 (12/1/2023  8:39 AM)          ECG 12 Lead    Date/Time: 12/1/2023 8:39 AM  Performed by: Rafal Salmno MD    Authorized by: Rafal Salmon MD  Comparison: compared with previous ECG from 10/20/2023  Similar to previous ECG  Rhythm: sinus rhythm  Rate: normal  Conduction: conduction normal  QRS axis: right  Other findings comments: S1Q3T3 pulmonary strain pattern    Clinical impression: abnormal EKG              Assessment and Plan   Diagnoses and all orders for this visit:    1. Paroxysmal atrial fibrillation (Primary)  -     ECG 12 Lead        Garcia Greene is a 43 y.o. male with problem list as above who presents to the clinic for evaluation of paroxysmal atrial fibrillation.  His episodes have markedly decreased in frequency and he is now feeling better off all  medications.  Given these findings, we discussed available treatment options including ablation for long-term prevention of atrial fibrillation progression as well as decreasing the episode frequency.  This is a procedure which carries a risk of complications including but not limited to life-threatening complications such as internal bleeding, tamponade, stroke, MI, and ultimately death.  With this in mind and given that he is feeling well, he would like to hold off on any procedures for his atrial fibrillation at this time.  I think that this is a reasonable decision.  Should he have worsening episodes in the future, he does have a class I indication for ablation of paroxysmal atrial fibrillation given his young age and relative lack of comorbidities in combination with symptomatic paroxysmal atrial fibrillation.    In regards to risk factor modification, his BMI is elevated however his overall body fat index is quite low.  We discussed that there still is some risk with the elevated weight and consideration of reduced weight lifting and increased focus on cardiovascular exercise may be beneficial.  Additionally, I do think that his other risk factor for atrial fibrillation is his testosterone supplementation.  I encouraged him to consider lower doses of his testosterone as it is likely impacting his polycythemia vera as well.  He is already compliant with his CPAP machine.      Plan:  -No ablation after risk-benefit discussion as above  -No anticoagulation at this time given [id number] of 0  -No additional medications at this time  -Should his symptoms worsen in the future, would consider primary therapy with ablation  -Return to clinic in 6 months  -Encouraged to call us with any worsening episodes         Follow Up   Return in about 6 months (around 6/1/2024).  Patient was given instructions and counseling regarding his condition or for health maintenance advice. Please see specific information pulled into  the AVS if appropriate.     Part of this note may be an electronic transcription/translation of spoken language to printed text using the Dragon Dictation System.

## 2024-01-13 ENCOUNTER — DOCUMENTATION (OUTPATIENT)
Dept: FAMILY MEDICINE CLINIC | Facility: CLINIC | Age: 44
End: 2024-01-13
Payer: MEDICAID

## 2024-01-13 RX ORDER — MOXIFLOXACIN 5 MG/ML
1 SOLUTION/ DROPS OPHTHALMIC 3 TIMES DAILY
Qty: 10 ML | Refills: 0 | Status: SHIPPED | OUTPATIENT
Start: 2024-01-13

## 2024-01-14 RX ORDER — MOXIFLOXACIN 5 MG/ML
1 SOLUTION/ DROPS OPHTHALMIC 3 TIMES DAILY
Qty: 2 ML | Refills: 0 | Status: SHIPPED | OUTPATIENT
Start: 2024-01-14 | End: 2024-01-21

## 2024-04-22 ENCOUNTER — OFFICE VISIT (OUTPATIENT)
Dept: CARDIOLOGY | Facility: CLINIC | Age: 44
End: 2024-04-22
Payer: MEDICAID

## 2024-04-22 VITALS
HEART RATE: 66 BPM | SYSTOLIC BLOOD PRESSURE: 130 MMHG | HEIGHT: 69 IN | DIASTOLIC BLOOD PRESSURE: 82 MMHG | BODY MASS INDEX: 33.62 KG/M2 | RESPIRATION RATE: 18 BRPM | WEIGHT: 227 LBS | OXYGEN SATURATION: 98 %

## 2024-04-22 DIAGNOSIS — I48.0 PAROXYSMAL ATRIAL FIBRILLATION: Primary | ICD-10-CM

## 2024-04-22 DIAGNOSIS — D45 POLYCYTHEMIA VERA: ICD-10-CM

## 2024-04-22 DIAGNOSIS — G47.33 OSA (OBSTRUCTIVE SLEEP APNEA): ICD-10-CM

## 2024-04-22 PROCEDURE — 1160F RVW MEDS BY RX/DR IN RCRD: CPT | Performed by: INTERNAL MEDICINE

## 2024-04-22 PROCEDURE — 99213 OFFICE O/P EST LOW 20 MIN: CPT | Performed by: INTERNAL MEDICINE

## 2024-04-22 PROCEDURE — 93000 ELECTROCARDIOGRAM COMPLETE: CPT | Performed by: INTERNAL MEDICINE

## 2024-04-22 PROCEDURE — 1159F MED LIST DOCD IN RCRD: CPT | Performed by: INTERNAL MEDICINE

## 2024-04-22 NOTE — PROGRESS NOTES
Reason for Visit: cardiovascular follow up.    HPI:  Garcia Greene is a 43 y.o. male is here today for 6 month follow-up.  He is doing well today and has no current issues or complaints.  He has not noticed any recurrence of atrial fibrillation.  He denies any chest pain, palpitations, dizziness, syncope, PND, or orthopnea.  He continues to exercise regularly and just went to the gym before his appointment today.  He is compliant with APAP and plans to get a new mask since he has had his current one for about 10 years.  He has not been doing blood donations since he does not like needles.    Previous Cardiac Testing and Procedures:  -EKG (8/7/2023) atrial fibrillation with RVR  -Echo (9/21/2023) EF 56-60%, normal diastolic function, normal RV size and function, normal valves, liver cyst     Lab data:  -TSH (3/15/2023) 3.86  -Lipid panel (3/15/2023) total cholesterol 235, HDL 24, , triglycerides 201  -BMP (8/7/2023) creatinine 1.73, GFR 49, potassium not calculated, sodium 139  -CBC (8/7/2023) WBC 6.8, hemoglobin 19.7, hematocrit 56.2%, platelets 207    Patient Active Problem List   Diagnosis    Right carpal tunnel syndrome    Hypothyroid    KAILEY (obstructive sleep apnea)    Paroxysmal atrial fibrillation    Polycythemia vera       Social History     Tobacco Use    Smoking status: Never     Passive exposure: Never    Smokeless tobacco: Never   Vaping Use    Vaping status: Never Used   Substance Use Topics    Alcohol use: Never    Drug use: Never       Family History   Problem Relation Age of Onset    Hypertension Mother     Arrhythmia Father     Heart attack Maternal Uncle        The following portions of the patient's history were reviewed and updated as appropriate: allergies, current medications, past family history, past medical history, past social history, past surgical history, and problem list.      Current Outpatient Medications:     ascorbic acid (VITAMIN C) 1000 MG tablet, , Disp: , Rfl:      "Cholecalciferol (VITAMIN D3) 5000 units capsule capsule, Take 1 capsule by mouth Daily., Disp: , Rfl:     Coenzyme Q10 (CoQ-10) 100 MG capsule, , Disp: , Rfl:     KRILL OIL PO, Take 700 mg by mouth Daily., Disp: , Rfl:     Magnesium 200 MG tablet, , Disp: , Rfl:     Testosterone Cypionate (DEPOTESTOTERONE CYPIONATE) 200 MG/ML injection, , Disp: , Rfl:     Zinc 50 MG tablet, , Disp: , Rfl:     moxifloxacin (Vigamox) 0.5 % ophthalmic solution, Administer 0.05 mL to both eyes 3 (Three) Times a Day., Disp: 10 mL, Rfl: 0    Review of Systems   Constitutional: Negative for chills and fever.   Cardiovascular:  Negative for chest pain and paroxysmal nocturnal dyspnea.   Respiratory:  Negative for cough and shortness of breath.    Skin:  Negative for rash.   Gastrointestinal:  Negative for abdominal pain and heartburn.   Neurological:  Negative for dizziness and numbness.       Objective   /82   Pulse 66   Resp 18   Ht 175.3 cm (69\")   Wt 103 kg (227 lb)   SpO2 98%   BMI 33.52 kg/m²   Constitutional:       Appearance: Well-developed.   HENT:      Head: Normocephalic and atraumatic.   Pulmonary:      Effort: Pulmonary effort is normal.      Breath sounds: Normal breath sounds.   Cardiovascular:      Normal rate. Regular rhythm.      Murmurs: There is no murmur.   Edema:     Peripheral edema absent.   Skin:     General: Skin is warm and dry.   Neurological:      Mental Status: Alert and oriented to person, place, and time.         ECG 12 Lead    Date/Time: 4/22/2024 8:57 AM  Performed by: Oswaldo Hill MD    Authorized by: Oswaldo Hill MD  Comparison: compared with previous ECG from 12/1/2023  Similar to previous ECG  Rhythm: sinus rhythm  Rate: normal  QRS axis: right            ICD-10-CM ICD-9-CM   1. Paroxysmal atrial fibrillation  I48.0 427.31   2. KAILEY (obstructive sleep apnea)  G47.33 327.23   3. Polycythemia vera  D45 238.4         Assessment/Plan:  1.  Paroxysmal atrial fibrillation: Diagnosed on EKG " from 8/7/2023. Remains in sinus rhythm today and has not had any recent recurrent episodes.  He was seen in EP clinic by Dr. Salmon in December and plan to continue observation at this time.  No anticoagulation given [id number] of 0.     2.  Obstructive sleep apnea: Continue APAP.     3.  Polycythemia vera: H&H of 19.7 g/Forrest and hematocrit of 56.2% respectively on CBC from 8/7/2023.  I again encouraged him to consider regular phlebotomy given potential association with atrial fibrillation.

## 2024-04-22 NOTE — LETTER
April 22, 2024     CARLIN Almaraz  [address]  [place] [address]    Patient: Garcia Greene   YOB: 1980   Date of Visit: 4/22/2024       Dear CARLIN Almaraz    Garcia Greene was in my office today. Below is a copy of my note.    If you have questions, please do not hesitate to call me. I look forward to following Garcia along with you.         Sincerely,        Oswaldo Hill MD        CC: No Recipients      Reason for Visit: cardiovascular follow up.    HPI:  Garcia Greene is a 43 y.o. male is here today for 6 month follow-up.  He is doing well today and has no current issues or complaints.  He has not noticed any recurrence of atrial fibrillation.  He denies any chest pain, palpitations, dizziness, syncope, PND, or orthopnea.  He continues to exercise regularly and just went to the gym before his appointment today.  He is compliant with APAP and plans to get a new mask since he has had his current one for about 10 years.  He has not been doing blood donations since he does not like needles.    Previous Cardiac Testing and Procedures:  -EKG (8/7/2023) atrial fibrillation with RVR  -Echo (9/21/2023) EF 56-60%, normal diastolic function, normal RV size and function, normal valves, liver cyst     Lab data:  -TSH (3/15/2023) 3.86  -Lipid panel (3/15/2023) total cholesterol 235, HDL 24, , triglycerides 201  -BMP (8/7/2023) creatinine 1.73, GFR 49, potassium not calculated, sodium 139  -CBC (8/7/2023) WBC 6.8, hemoglobin 19.7, hematocrit 56.2%, platelets 207    Patient Active Problem List   Diagnosis   • Right carpal tunnel syndrome   • Hypothyroid   • KAILEY (obstructive sleep apnea)   • Paroxysmal atrial fibrillation   • Polycythemia vera       Social History     Tobacco Use   • Smoking status: Never     Passive exposure: Never   • Smokeless tobacco: Never   Vaping Use   • Vaping status: Never Used   Substance Use Topics   • Alcohol use: Never   • Drug use: Never       Family History  "  Problem Relation Age of Onset   • Hypertension Mother    • Arrhythmia Father    • Heart attack Maternal Uncle        The following portions of the patient's history were reviewed and updated as appropriate: allergies, current medications, past family history, past medical history, past social history, past surgical history, and problem list.      Current Outpatient Medications:   •  ascorbic acid (VITAMIN C) 1000 MG tablet, , Disp: , Rfl:   •  Cholecalciferol (VITAMIN D3) 5000 units capsule capsule, Take 1 capsule by mouth Daily., Disp: , Rfl:   •  Coenzyme Q10 (CoQ-10) 100 MG capsule, , Disp: , Rfl:   •  KRILL OIL PO, Take 700 mg by mouth Daily., Disp: , Rfl:   •  Magnesium 200 MG tablet, , Disp: , Rfl:   •  Testosterone Cypionate (DEPOTESTOTERONE CYPIONATE) 200 MG/ML injection, , Disp: , Rfl:   •  Zinc 50 MG tablet, , Disp: , Rfl:   •  moxifloxacin (Vigamox) 0.5 % ophthalmic solution, Administer 0.05 mL to both eyes 3 (Three) Times a Day., Disp: 10 mL, Rfl: 0    Review of Systems   Constitutional: Negative for chills and fever.   Cardiovascular:  Negative for chest pain and paroxysmal nocturnal dyspnea.   Respiratory:  Negative for cough and shortness of breath.    Skin:  Negative for rash.   Gastrointestinal:  Negative for abdominal pain and heartburn.   Neurological:  Negative for dizziness and numbness.       Objective  /82   Pulse 66   Resp 18   Ht 175.3 cm (69\")   Wt 103 kg (227 lb)   SpO2 98%   BMI 33.52 kg/m²   Constitutional:       Appearance: Well-developed.   HENT:      Head: Normocephalic and atraumatic.   Pulmonary:      Effort: Pulmonary effort is normal.      Breath sounds: Normal breath sounds.   Cardiovascular:      Normal rate. Regular rhythm.      Murmurs: There is no murmur.   Edema:     Peripheral edema absent.   Skin:     General: Skin is warm and dry.   Neurological:      Mental Status: Alert and oriented to person, place, and time.         ECG 12 Lead    Date/Time: 4/22/2024 " 8:57 AM  Performed by: Oswaldo Hill MD    Authorized by: Oswaldo Hill MD  Comparison: compared with previous ECG from 12/1/2023  Similar to previous ECG  Rhythm: sinus rhythm  Rate: normal  QRS axis: right            ICD-10-CM ICD-9-CM   1. Paroxysmal atrial fibrillation  I48.0 427.31   2. KAILEY (obstructive sleep apnea)  G47.33 327.23   3. Polycythemia vera  D45 238.4         Assessment/Plan:  1.  Paroxysmal atrial fibrillation: Diagnosed on EKG from 8/7/2023. Remains in sinus rhythm today and has not had any recent recurrent episodes.  He was seen in EP clinic by Dr. Salmon in December and plan to continue observation at this time.  No anticoagulation given [id number] of 0.     2.  Obstructive sleep apnea: Continue APAP.     3.  Polycythemia vera: H&H of 19.7 g/Forrest and hematocrit of 56.2% respectively on CBC from 8/7/2023.  I again encouraged him to consider regular phlebotomy given potential association with atrial fibrillation.

## 2025-05-23 ENCOUNTER — OFFICE VISIT (OUTPATIENT)
Dept: CARDIOLOGY | Facility: CLINIC | Age: 45
End: 2025-05-23
Payer: MEDICAID

## 2025-05-23 VITALS
HEART RATE: 83 BPM | DIASTOLIC BLOOD PRESSURE: 66 MMHG | SYSTOLIC BLOOD PRESSURE: 118 MMHG | BODY MASS INDEX: 32.44 KG/M2 | OXYGEN SATURATION: 98 % | WEIGHT: 219 LBS | HEIGHT: 69 IN

## 2025-05-23 DIAGNOSIS — I48.0 PAROXYSMAL ATRIAL FIBRILLATION: Primary | ICD-10-CM

## 2025-05-23 DIAGNOSIS — D45 POLYCYTHEMIA VERA: ICD-10-CM

## 2025-05-23 DIAGNOSIS — G47.33 OSA (OBSTRUCTIVE SLEEP APNEA): ICD-10-CM

## 2025-05-23 DIAGNOSIS — E78.2 MIXED HYPERLIPIDEMIA: ICD-10-CM

## 2025-05-23 PROCEDURE — 1160F RVW MEDS BY RX/DR IN RCRD: CPT | Performed by: INTERNAL MEDICINE

## 2025-05-23 PROCEDURE — 99213 OFFICE O/P EST LOW 20 MIN: CPT | Performed by: INTERNAL MEDICINE

## 2025-05-23 PROCEDURE — 1159F MED LIST DOCD IN RCRD: CPT | Performed by: INTERNAL MEDICINE

## 2025-05-23 NOTE — PROGRESS NOTES
Reason for Visit: cardiovascular follow up for paroxysmal atrial fibrillation.    HPI:  Garcia Greene is a 44 y.o. male is here today for 1 year follow-up.  He follows in cardiology clinic due to paroxysmal atrial fibrillation.  He was also seen in EP clinic by Dr. Salmon.  He has not had any palpitations or recurrence of atrial fibrillation.  He has been following a carnivore diet.  He is getting ready to have repeat blood work.  He denies any chest pain, dizziness, syncope, PND, or orthopnea.  He continues to exercise regularly.  He started taking supplements again.      Previous Cardiac Testing and Procedures:  -EKG (8/7/2023) atrial fibrillation with RVR  -Echo (9/21/2023) EF 56-60%, normal diastolic function, normal RV size and function, normal valves, liver cyst     Lab data:  -TSH (3/15/2023) 3.86  -Lipid panel (3/15/2023) total cholesterol 235, HDL 24, , triglycerides 201  -BMP (8/7/2023) creatinine 1.73, GFR 49, potassium not calculated, sodium 139  -CBC (8/7/2023) WBC 6.8, hemoglobin 19.7, hematocrit 56.2%, platelets 207  -Lipid panel (12/2024) total cholesterol 223, HDL 35, , triglycerides 190    Patient Active Problem List   Diagnosis    Right carpal tunnel syndrome    Hypothyroid    KAILEY (obstructive sleep apnea)    Paroxysmal atrial fibrillation    Polycythemia vera    Mixed hyperlipidemia       Social History     Tobacco Use    Smoking status: Never     Passive exposure: Never    Smokeless tobacco: Never   Vaping Use    Vaping status: Never Used   Substance Use Topics    Alcohol use: Never    Drug use: Never       Family History   Problem Relation Age of Onset    Hypertension Mother     Arrhythmia Father     Heart attack Maternal Uncle        The following portions of the patient's history were reviewed and updated as appropriate: allergies, current medications, past family history, past medical history, past social history, past surgical history, and problem list.      Current  "Outpatient Medications:     ascorbic acid (VITAMIN C) 1000 MG tablet, , Disp: , Rfl:     Cholecalciferol (VITAMIN D3) 5000 units capsule capsule, Take 1 capsule by mouth Daily., Disp: , Rfl:     Coenzyme Q10 (CoQ-10) 100 MG capsule, , Disp: , Rfl:     Magnesium 200 MG tablet, , Disp: , Rfl:     Testosterone Cypionate (DEPOTESTOTERONE CYPIONATE) 200 MG/ML injection, , Disp: , Rfl:     Zinc 50 MG tablet, , Disp: , Rfl:     KRILL OIL PO, Take 700 mg by mouth Daily. (Patient not taking: Reported on 5/23/2025), Disp: , Rfl:     Review of Systems   Constitutional: Negative for chills and fever.   Cardiovascular:  Negative for chest pain, irregular heartbeat, palpitations, paroxysmal nocturnal dyspnea and syncope.   Respiratory:  Negative for cough and shortness of breath.    Skin:  Negative for rash.   Gastrointestinal:  Negative for abdominal pain and heartburn.   Neurological:  Negative for dizziness and numbness.       Objective   /66 (BP Location: Left arm, Patient Position: Sitting, Cuff Size: Adult)   Pulse 83   Ht 175.3 cm (69.02\")   Wt 99.3 kg (219 lb)   SpO2 98%   BMI 32.33 kg/m²   Constitutional:       Appearance: Well-developed.   HENT:      Head: Normocephalic and atraumatic.   Pulmonary:      Effort: Pulmonary effort is normal.      Breath sounds: Normal breath sounds.   Cardiovascular:      Normal rate. Regular rhythm.      Murmurs: There is no murmur.   Edema:     Peripheral edema absent.   Skin:     General: Skin is warm and dry.   Neurological:      Mental Status: Alert and oriented to person, place, and time.       Procedures      ICD-10-CM ICD-9-CM   1. Paroxysmal atrial fibrillation  I48.0 427.31   2. KAILEY (obstructive sleep apnea)  G47.33 327.23   3. Mixed hyperlipidemia  E78.2 272.2   4. Polycythemia vera  D45 238.4         Assessment/Plan:  1.  Paroxysmal atrial fibrillation: Diagnosed on EKG on 8/7/2023.  Regular rhythm on exam today.  He has not had any palpitations or evidence of " recurrence.  He has a smart watch that can detect atrial fibrillation.  Previously seen in EP clinic with Dr Salmon.  No anticoagulation as [id number] is 0.     2.  Obstructive sleep apnea: Managed on APAP.     3.  Mixed hyperlipidemia: Lipid panel from 12/2024 showed elevated LDL and triglycerides with low HDL.  This does represent an improvement compared to prior lipid panel in 2023.  His carnivore diet may be contributing somewhat.  10-year ASCVD risk is 3.6%.  Counseled on lifestyle modification with focus on minimizing saturated fats.    4.  Polycythemia vera: Patient reports following in hematology clinic and is considering bloodletting.

## 2025-07-11 DIAGNOSIS — H10.509 BLEPHAROCONJUNCTIVITIS, UNSPECIFIED BLEPHAROCONJUNCTIVITIS TYPE, UNSPECIFIED LATERALITY: Primary | ICD-10-CM

## 2025-07-11 RX ORDER — MOXIFLOXACIN 5 MG/ML
1 SOLUTION/ DROPS OPHTHALMIC 3 TIMES DAILY
Qty: 3 ML | Refills: 0 | Status: SHIPPED | OUTPATIENT
Start: 2025-07-11

## (undated) DEVICE — SPNG GZ WOVN 4X4IN 12PLY 10/BX STRL

## (undated) DEVICE — BNDG ELAS ECON W/CLIP 3IN 5YD LF STRL

## (undated) DEVICE — BNDG GZ SOF-FORM CONFRM 2X75IN LF STRL

## (undated) DEVICE — 3M™ STERI-STRIP™ REINFORCED ADHESIVE SKIN CLOSURES, R1547, 1/2 IN X 4 IN (12 MM X 100 MM), 6 STRIPS/ENVELOPE: Brand: 3M™ STERI-STRIP™

## (undated) DEVICE — GLV SURG TRIUMPH GREEN W/ALOE PF LTX 8 STRL

## (undated) DEVICE — SUT ETHLN 3/0 FS1 30IN 669H

## (undated) DEVICE — PK EXTRM 30

## (undated) DEVICE — BLD SCLPL BEAVR MINI STR 2BVL 180D LF

## (undated) DEVICE — GLV SURG BIOGEL LTX PF 8

## (undated) DEVICE — PK TURNOVER RM ADV

## (undated) DEVICE — GOWN,PREVENTION PLUS,XLONG/XLARGE,STRL: Brand: MEDLINE

## (undated) DEVICE — UNDERCAST PADDING: Brand: DEROYAL